# Patient Record
Sex: FEMALE | Race: WHITE | NOT HISPANIC OR LATINO | Employment: OTHER | URBAN - METROPOLITAN AREA
[De-identification: names, ages, dates, MRNs, and addresses within clinical notes are randomized per-mention and may not be internally consistent; named-entity substitution may affect disease eponyms.]

---

## 2017-03-20 ENCOUNTER — INFUSION (OUTPATIENT)
Dept: ONCOLOGY | Facility: HOSPITAL | Age: 72
End: 2017-03-20

## 2017-03-20 ENCOUNTER — LAB (OUTPATIENT)
Dept: LAB | Facility: HOSPITAL | Age: 72
End: 2017-03-20

## 2017-03-20 ENCOUNTER — OFFICE VISIT (OUTPATIENT)
Dept: ONCOLOGY | Facility: CLINIC | Age: 72
End: 2017-03-20

## 2017-03-20 VITALS — WEIGHT: 140.8 LBS | BODY MASS INDEX: 24.95 KG/M2

## 2017-03-20 VITALS
HEART RATE: 72 BPM | WEIGHT: 140.6 LBS | SYSTOLIC BLOOD PRESSURE: 132 MMHG | HEIGHT: 63 IN | DIASTOLIC BLOOD PRESSURE: 80 MMHG | TEMPERATURE: 98.4 F | BODY MASS INDEX: 24.91 KG/M2 | RESPIRATION RATE: 16 BRPM

## 2017-03-20 DIAGNOSIS — M85.80 OSTEOPENIA: ICD-10-CM

## 2017-03-20 DIAGNOSIS — D05.11 DUCTAL CARCINOMA IN SITU (DCIS) OF RIGHT BREAST: Primary | ICD-10-CM

## 2017-03-20 DIAGNOSIS — M85.80 OSTEOPENIA: Primary | ICD-10-CM

## 2017-03-20 LAB
ALBUMIN SERPL-MCNC: 4.3 G/DL (ref 3.5–5.2)
ALBUMIN/GLOB SERPL: 1.7 G/DL (ref 1.1–2.4)
ALP SERPL-CCNC: 46 U/L (ref 38–116)
ALT SERPL W P-5'-P-CCNC: 30 U/L (ref 0–33)
ANION GAP SERPL CALCULATED.3IONS-SCNC: 10.5 MMOL/L
AST SERPL-CCNC: 28 U/L (ref 0–32)
BASOPHILS # BLD AUTO: 0.05 10*3/MM3 (ref 0–0.1)
BASOPHILS NFR BLD AUTO: 1.3 % (ref 0–1.1)
BILIRUB SERPL-MCNC: 0.6 MG/DL (ref 0.1–1.2)
BUN BLD-MCNC: 19 MG/DL (ref 6–20)
BUN/CREAT SERPL: 25.7 (ref 7.3–30)
CALCIUM SPEC-SCNC: 10.4 MG/DL (ref 8.5–10.2)
CHLORIDE SERPL-SCNC: 103 MMOL/L (ref 98–107)
CO2 SERPL-SCNC: 27.5 MMOL/L (ref 22–29)
CREAT BLD-MCNC: 0.74 MG/DL (ref 0.6–1.1)
DEPRECATED RDW RBC AUTO: 42.7 FL (ref 37–49)
EOSINOPHIL # BLD AUTO: 0.24 10*3/MM3 (ref 0–0.36)
EOSINOPHIL NFR BLD AUTO: 6.4 % (ref 1–5)
ERYTHROCYTE [DISTWIDTH] IN BLOOD BY AUTOMATED COUNT: 12.5 % (ref 11.7–14.5)
GFR SERPL CREATININE-BSD FRML MDRD: 78 ML/MIN/1.73
GLOBULIN UR ELPH-MCNC: 2.5 GM/DL (ref 1.8–3.5)
GLUCOSE BLD-MCNC: 97 MG/DL (ref 74–124)
HCT VFR BLD AUTO: 43.4 % (ref 34–45)
HGB BLD-MCNC: 14.2 G/DL (ref 11.5–14.9)
HOLD SPECIMEN: NORMAL
IMM GRANULOCYTES # BLD: 0.01 10*3/MM3 (ref 0–0.03)
IMM GRANULOCYTES NFR BLD: 0.3 % (ref 0–0.5)
LYMPHOCYTES # BLD AUTO: 0.77 10*3/MM3 (ref 1–3.5)
LYMPHOCYTES NFR BLD AUTO: 20.6 % (ref 20–49)
MAGNESIUM SERPL-MCNC: 1.9 MG/DL (ref 1.8–2.5)
MCH RBC QN AUTO: 30.2 PG (ref 27–33)
MCHC RBC AUTO-ENTMCNC: 32.7 G/DL (ref 32–35)
MCV RBC AUTO: 92.3 FL (ref 83–97)
MONOCYTES # BLD AUTO: 0.33 10*3/MM3 (ref 0.25–0.8)
MONOCYTES NFR BLD AUTO: 8.8 % (ref 4–12)
NEUTROPHILS # BLD AUTO: 2.34 10*3/MM3 (ref 1.5–7)
NEUTROPHILS NFR BLD AUTO: 62.6 % (ref 39–75)
NRBC BLD MANUAL-RTO: 0 /100 WBC (ref 0–0)
PHOSPHATE SERPL-MCNC: 2.5 MG/DL (ref 2.5–4.5)
PLATELET # BLD AUTO: 179 10*3/MM3 (ref 150–375)
PMV BLD AUTO: 9.5 FL (ref 8.9–12.1)
POTASSIUM BLD-SCNC: 4.4 MMOL/L (ref 3.5–4.7)
PROT SERPL-MCNC: 6.8 G/DL (ref 6.3–8)
RBC # BLD AUTO: 4.7 10*6/MM3 (ref 3.9–5)
SODIUM BLD-SCNC: 141 MMOL/L (ref 134–145)
WBC NRBC COR # BLD: 3.74 10*3/MM3 (ref 4–10)

## 2017-03-20 PROCEDURE — 36415 COLL VENOUS BLD VENIPUNCTURE: CPT | Performed by: INTERNAL MEDICINE

## 2017-03-20 PROCEDURE — 83735 ASSAY OF MAGNESIUM: CPT | Performed by: INTERNAL MEDICINE

## 2017-03-20 PROCEDURE — 85025 COMPLETE CBC W/AUTO DIFF WBC: CPT | Performed by: INTERNAL MEDICINE

## 2017-03-20 PROCEDURE — 80053 COMPREHEN METABOLIC PANEL: CPT | Performed by: INTERNAL MEDICINE

## 2017-03-20 PROCEDURE — 96372 THER/PROPH/DIAG INJ SC/IM: CPT | Performed by: INTERNAL MEDICINE

## 2017-03-20 PROCEDURE — 25010000002 DENOSUMAB 60 MG/ML SOLUTION: Performed by: INTERNAL MEDICINE

## 2017-03-20 PROCEDURE — 84100 ASSAY OF PHOSPHORUS: CPT | Performed by: INTERNAL MEDICINE

## 2017-03-20 PROCEDURE — 99213 OFFICE O/P EST LOW 20 MIN: CPT | Performed by: INTERNAL MEDICINE

## 2017-03-20 RX ADMIN — DENOSUMAB 60 MG: 60 INJECTION SUBCUTANEOUS at 11:24

## 2017-03-20 NOTE — PROGRESS NOTES
REASONS FOR FOLLOW-UP:   1.  History of atypical lobular hyperplasia and LCIS of the breast diagnosed        2/2015   2.  Ductal carcinoma in situ, high-grade with necrosis, of the right breast        S/p lumpectomy 1/20/16 and  RT   3.  Chemoprevention with exemestane initiated February 2016   4.  Osteopenia (L hip T-score -2.1) on Prolia Q6 months    HISTORY OF PRESENT ILLNESS:     History of Present Illness  Mrs. Riggs returns today for follow-up of her above history.  Given her high risk breast findings, we initiated chemoprevention with exemestane Feb 2016.    She returned today for review.  She has had no changes on her self breast exam and continues to get imaging and follow-up with Dr. Pierce.    She complains today of pain affecting the bases of both thumbs present for approximately 3 months.  The discomfort has been stable with no worsening or alleviating factors.  She has sometimes trouble opening jars because of the discomfort.  No other joints are affected other than the base of thumb.  He has been associated with mild swelling of the joint.      Oncology/Hematology History    1. 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   2.  ductal carcinom a in situ, high grade with focal necrosis of the right breast status post lumpectomy on 01/20/2016 with negative margins; DCIS is ER/NE negative; patient received post-lumpectomy radiation therapy  3. Chemoprevention initiated with Exemestane February 2016        Ductal carcinoma in situ (DCIS) of right breast    3/16/2016 Initial Diagnosis    Ductal carcinoma in situ (DCIS) of right breast       Past Medical History, Past Surgical History, Social History, Family History have been reviewed and are without significant changes except as mentioned.    Review of Systems   A comprehensive  14 point review of systems was performed and was negative except as mentioned.    Medications:  The current medication list was reviewed in the EMR    ALLERGIES:  No Known Allergies    Objective      There were no vitals filed for this visit.  Current Status 7/1/2016   ECOG score 0       Physical Exam   Constitutional: She is oriented to person, place, and time. She appears well-developed and well-nourished.   HENT:   Head: Normocephalic and atraumatic.   Musculoskeletal:   Mild pain on palpation at the base of both thumbs, no significant effusion or warmth   Neurological: She is alert and oriented to person, place, and time.   Skin: Skin is warm and dry.          RECENT LABS:  Hematology WBC   Date Value Ref Range Status   03/20/2017 3.74 (L) 4.00 - 10.00 10*3/mm3 Final   01/11/2016 4.32 (L) 4.50 - 10.70 K/Cumm Final     RBC   Date Value Ref Range Status   03/20/2017 4.70 3.90 - 5.00 10*6/mm3 Final   01/11/2016 4.60 3.90 - 5.20 Million Final     HEMOGLOBIN   Date Value Ref Range Status   03/20/2017 14.2 11.5 - 14.9 g/dL Final   01/11/2016 13.8 11.9 - 15.5 g/dL Final     HEMATOCRIT   Date Value Ref Range Status   03/20/2017 43.4 34.0 - 45.0 % Final   01/11/2016 42.4 35.6 - 45.5 % Final     PLATELETS   Date Value Ref Range Status   03/20/2017 179 150 - 375 10*3/mm3 Final   01/11/2016 226 140 - 500 K/Cumm Final      DEXA:   osteopenia T-score -2.1 left hip (11/24/15)    Assessment/Plan       1.  High risk breast lesions including previous lobular hyperplasia and lobular carcinoma in situ of the left breast, now with ductal carcinoma in situ high-grade of the right breast status post lumpectomy and undergoing radiation therapy.  Given her high risk findings for increased risk of invasive breast cancer, the patient is undergoing chemoprevention with exemestane initiated February 2016.  She continues routine breast surveillance and exams with Dr. Pierce.    Today, she was having pain at the base of both thumbs present  for 3 months.  This may be a side effect of exemestane.  I recommended she hold the medicine for 3-4 weeks.  Usually if arthralgia is related to an aromatase inhibitor, it will improve rather quickly.  If the pain does not improve with holding exemestane, I recommended she see her primary care physician.  If the pain improves, she can retry exemestane after a 3-4 week washout period.  If pain recurs, I requested she call our office and we will switch to an alternative aromatase inhibitor.    3.  Osteopenia:  Given the risk of further bone loss with her aromatase inhibitor, we initiated Prolia 3/18/16. .  We will continue Prolia every 6 months while on aromatase inhibitor therapy.  She will have a BMP, mag, en face checked prior to each injection.  We will recheck her DEXA scan after being on the AI for 2 years.                    3/20/2017      CC:

## 2017-04-27 ENCOUNTER — HOSPITAL ENCOUNTER (OUTPATIENT)
Dept: GENERAL RADIOLOGY | Facility: HOSPITAL | Age: 72
Discharge: HOME | End: 2017-04-27
Admitting: NURSE PRACTITIONER

## 2017-04-27 ENCOUNTER — TRANSCRIBE ORDERS (OUTPATIENT)
Dept: ADMINISTRATIVE | Facility: HOSPITAL | Age: 72
End: 2017-04-27

## 2017-04-27 DIAGNOSIS — M25.60 STIFFNESS IN JOINT: ICD-10-CM

## 2017-04-27 DIAGNOSIS — R52 PAIN: ICD-10-CM

## 2017-04-27 DIAGNOSIS — M25.60 STIFFNESS IN JOINT: Primary | ICD-10-CM

## 2017-04-27 PROCEDURE — 73130 X-RAY EXAM OF HAND: CPT

## 2017-05-01 RX ORDER — EXEMESTANE 25 MG/1
TABLET ORAL
Qty: 30 TABLET | Refills: 0 | Status: SHIPPED | OUTPATIENT
Start: 2017-05-01 | End: 2017-06-13

## 2017-05-03 ENCOUNTER — TELEPHONE (OUTPATIENT)
Dept: ONCOLOGY | Facility: CLINIC | Age: 72
End: 2017-05-03

## 2017-06-01 ENCOUNTER — HOSPITAL ENCOUNTER (OUTPATIENT)
Dept: MRI IMAGING | Facility: HOSPITAL | Age: 72
Discharge: HOME | End: 2017-06-01
Attending: SURGERY | Admitting: SURGERY

## 2017-06-01 ENCOUNTER — APPOINTMENT (OUTPATIENT)
Dept: WOMENS IMAGING | Facility: HOSPITAL | Age: 72
End: 2017-06-01

## 2017-06-01 DIAGNOSIS — D05.91 CARCINOMA IN SITU OF RIGHT BREAST: ICD-10-CM

## 2017-06-01 DIAGNOSIS — N64.89 RADIAL SCAR OF BREAST: ICD-10-CM

## 2017-06-01 DIAGNOSIS — D05.02 LOBULAR CARCINOMA IN SITU OF LEFT BREAST: ICD-10-CM

## 2017-06-01 LAB — CREAT BLDA-MCNC: 0.7 MG/DL (ref 0.6–1.3)

## 2017-06-01 PROCEDURE — C8908 MRI W/O FOL W/CONT, BREAST,: HCPCS

## 2017-06-01 PROCEDURE — G0206 DX MAMMO INCL CAD UNI: HCPCS | Performed by: RADIOLOGY

## 2017-06-01 PROCEDURE — G0279 TOMOSYNTHESIS, MAMMO: HCPCS | Performed by: RADIOLOGY

## 2017-06-01 PROCEDURE — 0 GADOBENATE DIMEGLUMINE 529 MG/ML SOLUTION: Performed by: SURGERY

## 2017-06-01 PROCEDURE — 0159T HC MRI BREAST COMPUTER ANALYSIS: CPT

## 2017-06-01 PROCEDURE — 82565 ASSAY OF CREATININE: CPT

## 2017-06-01 PROCEDURE — A9577 INJ MULTIHANCE: HCPCS | Performed by: SURGERY

## 2017-06-01 RX ADMIN — GADOBENATE DIMEGLUMINE 13 ML: 529 INJECTION, SOLUTION INTRAVENOUS at 12:00

## 2017-06-06 ENCOUNTER — TELEPHONE (OUTPATIENT)
Dept: SURGERY | Facility: CLINIC | Age: 72
End: 2017-06-06

## 2017-06-13 ENCOUNTER — TELEPHONE (OUTPATIENT)
Dept: SURGERY | Facility: CLINIC | Age: 72
End: 2017-06-13

## 2017-06-13 ENCOUNTER — OFFICE VISIT (OUTPATIENT)
Dept: SURGERY | Facility: CLINIC | Age: 72
End: 2017-06-13

## 2017-06-13 VITALS
TEMPERATURE: 97.4 F | HEIGHT: 64 IN | HEART RATE: 66 BPM | DIASTOLIC BLOOD PRESSURE: 71 MMHG | OXYGEN SATURATION: 98 % | WEIGHT: 142.2 LBS | BODY MASS INDEX: 24.28 KG/M2 | SYSTOLIC BLOOD PRESSURE: 140 MMHG

## 2017-06-13 DIAGNOSIS — R92.8 ABNORMAL MRI, BREAST: Primary | ICD-10-CM

## 2017-06-13 DIAGNOSIS — D05.91 CARCINOMA IN SITU OF RIGHT BREAST: ICD-10-CM

## 2017-06-13 DIAGNOSIS — D05.02 LOBULAR CARCINOMA IN SITU OF LEFT BREAST: ICD-10-CM

## 2017-06-13 DIAGNOSIS — N64.89 RADIAL SCAR OF BREAST: ICD-10-CM

## 2017-06-13 PROCEDURE — 99214 OFFICE O/P EST MOD 30 MIN: CPT | Performed by: SURGERY

## 2017-06-13 RX ORDER — MELOXICAM 7.5 MG/1
7.5 TABLET ORAL DAILY
COMMUNITY
End: 2019-12-02

## 2017-06-13 NOTE — TELEPHONE ENCOUNTER
[facility] with [facility].  The breast is heterogenous leg dense.  Fat containing focal asymmetry with postsurgical scar posterior one third upper inner left breast, 8 cm from the nipple.  Calcifications are new compatible with oral cyst.  Stable postsurgical scar posterior one third upper outer left breast at area of excisional biopsy.  BI-RADS 3 recommend 6 month mammogram follow-up left calcifications.

## 2017-06-13 NOTE — PROGRESS NOTES
Chief Complaint: Reyna Riggs is a 71 y.o. 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  She noted no masses, skin changes, nipple discharge, nipple changes prior to her most recent imaging.       Her most recent imaging includes:  08/19/09          [facility]        Yearly screening mammogram          Yulia B. Mudge  The breasts are heterogeneously dense.   BIRADS Category 1: Negative     08/27/10        [facility]         Yearly screening mammogram         Yulia B. Mudge   The breasts are heterogeneously dense.   BIRADS Category 1: Negative     09/08/11         [facility]         Yearly screening mammogram        Yulia B [place]   The breasts are heterogeneously dense.   There is a stable area of architectural distortion with associated post-surgical scar seen in the upper outer region of the left breast. No significant change. Patient has reported has prior excisional left breast biopsy to account for this finding.   BIRADS Category 2: Benign     09/17/12        [facility]         Yearly screening mammogram        Yulia B [place]   The breasts are heterogeneously dense.   Architectural distortion is in and area of prior excisional biopsy. No significant change.   BIRADS Category 2: Benign     09/19/13        [facility]          Yearly screening mammogram           Yulia B [place]   The breasts are heterogeneously dense.   Stable area of architectural distortion upper outer region of the left breast.   BIRADS Category 2: Benign     11/12/14          [facility]           BILATERAL SCREENING MAMMOGRAM WITH TOMOSYNTHESIS       Reyna Riggs   The breasts are heterogeneously dense.   Finding 1:  "irregular mass measuring 11 millimeters posterior one-third of the left breast at 9 o'clock, in the central region and in the medial region. This finding is only seen on tomosynthesis.   Finding 2: Area of architectural distortion seen in the lateral region of the left breast.   In the right breast, no masses, significant calcifications or other abnormalities are seen.   IMPRESSION:   Finding 1: Requires additional evaluation.   Finding 2: Requires additional evaluation.   BIRADS Category 0 Incomplete     12/02/14       [facility]           LEFT DIAGNOSTIC MAMMOGRAM WITH TOMOSYNTHESIS       Reyna OSCAR Keely   The breast is heterogeneously dense.   Finding 1: irregular mass left breast 8 millimeters posterior one-third 10:30 o'clock region of the left breast located 6 centimeters from the nipple.   Finding 2: Area of architectural distortion in the left breast does not persist.   LEFT BREAST ULTRASOUND:  Finding 1: irregular taller-than-wide hypoechoic area with indisict margins measuring 5 x 3 x 2 mm.   Finding 2: There is no evidence of any solid mass or abnormal cystic elements.   IMPRESSION:   Finding 1: Suspicious   Finding 2: Benign-Negative   BIRADS Category 4A: Suspicious Abnormality    She had a biopsy on the following day that showed:    12/11/14        [facility]           Left Ultrasound Guided Vacuum Assisted Biopsy                           Reyna MOORE Keely  10:30 left breast  12 gauge  A total of 11 cores a(n) minicork shaped s-shereen tissue marker was placed at the biopsy site.   ADDENDUM 12-16-14:  Returned atypical lobular hyperplasia arising a radial scar.   Post clip mammogram demonstrates good position of the mini \"cork\" shaped clip at the 10:30 position of the left breast with no significant hematoma formation.     12/11/14        [facility]           Pathology Report            Reyna Riggs   Left Breast 10:30:  Atypical lobular hyperplasia arising in radial scar.       She has had  2 excisional breast biopsies  in " the past, LEFT breast, benign, age 42.  She has her uterus and ovaries, is postmenopausal, and takes no hormones.  Her family history includes the following: She has one sister, one brother, 2 daughters, 3 MA, 3 MU, 3 PU, 3 PA. No breast or ovarian cancer in her family.     We called Dr Melton to clarify preoperative CXR mention of tiny hyperdense nodules in the RIGHT lung- he stated not worrisome, no additional imaging or FU needed.    We went to the operating room on 1-7-15 for lumpectomy for excision atypical hyperplasia.    1-7-15 excision ALH returned as LCIS, 1.6 cm, focally present at inferior margin.    In the interim,  Yulia  has done well.  She has noted no changes in her breast exam. No new masses, skin changes,  nipple changes, nipple discharge either breast.   She denies headache, bone pain, belly pain, cough, changes in vision or gait.  She met with Dr Munguia and they decided not to start chemoprevention.      Her most recent imaging includes the followin/15/15            [facility]            LEFT BREAST MAMMOGRAM WITH TOMOSYNTHESIS          [place]     post biopsy follow-up.   the breast is heterogeneously dense.   Follow-up focal asymmetry in the left breast, 10:30 o'clock post surgical scar posterior one third medial left breast. In an area of prior excisional biopsy.   IMPRESSION:   Benign Negative  BI-RADS Category 2: Benign     In the interim,  Yulia  has done well.  She has noted no changes in her breast exam. No new masses, skin changes,  nipple changes, nipple discharge either breast.   She denies headache, bone pain, belly pain, cough, changes in vision or gait.    Her most recent imaging includes the followin-13-15       [facility]       Bilateral Screening Mammogram with Tomosynthesis      [place], [place]  The breast are heterogenously dense,  stable post- surgical scar medial region of the left breast. in  an area of prior excisional biopsy.  ImpressioN  benign-negative  Birads Category  2:  Benign    11-13-15        [facility]        Bilateral Breast MRi         Reyna Riggs  posterior one third upper inner left breast 10 o'clock there is mild postsurgical architectural distortion.  Within the posterior one third retroareolar region of the right breast 7.3  cm  posterior to   the nipple there is linear, branching enhancement that extends 2.2 cm anterior  to posterior,  0.4 and 1.2 cm in superior to inferior Mammographically, there are no calcifications seen within   this region.  Impression:  1.  Postsurgical site left breast with enhancement of a typically benign reactive character.   The imaging features do not have a suspicious feature but are somewhat indeterminate and the  area is likely best evaluated with a follow up breast MRI in 6 months to 12 months.  2.  Linear, branching enhancement seen in the posterior one third of   retroareolar region of the right breast.  Correlation with an MR guided right breast  biopsy is recommended.  Birads Category 4: Suspicious      I then arranged for a RIGHT breast MRI guided biopsy:    12/01/15                  [facility]              MRI GUIDED MAMMOTOME VACUUM ASSISTED RIGHT BREAST BIOPSY                 Reyna Riggs Yulia   8-gauge   Multiple tissue specimens  Post biopsy mammogram was obtained demonstrating adequate placement of a metallic clip in th eposterior 1/3 retroareolar region of the right breast.   The pathology result has returned as DCIS. This is concordant.     12/01/15                [facility]              RIGHT BREAST MRI BIOPSY            Pathology         Reyna Riggs   Diagnosis:   Right breast tissue, MRI directed biopsies:  Ductal carcinoma in situ, intermediate to high nuclear grade, without necrosis.   Hormone receptor studies pending   maximum span of 3mm.     12/01/15                       ER/CO                        Reyna Riggs  ER  0%  CO  0%      We went to the operating room on 1-20-16 for bracketted MRI guided needle loc lumpectomy-  pathology returned as 8mm DCIS, focally high grade, focal necrosis, margins, clear-- closest is to superior margin- typographical error in report, called on this and was <1/2 mm from superior margin,  but additional superior margin clear.   Path stage TisN0- stage 0.    She reports some lightning pain in her nipple. Denies any redness, warmth, drainage.      In the interim,  Reyna Riggs  has done well.  She took whole breast plus boost radiation 6 weeks with Dr. Parr from February 29, 2016 through April 8, 2016.  She tolerated this well.    She started Aromasin) Marie up with Dr. Munguia February 2016.  She is noticed a 5 pound weight gain.  Otherwise she feels that she is tolerating a well.    She has noted no changes in her breast exam. No new masses, skin changes, nipple changes, nipple discharge either breast.   She denies headache, bone pain, belly pain, cough, changes in vision or gait.  Her most recent imaging includes the following:  [facility] with [facility]D November 14, 2016 heterogenous a dense tissue.  Post surgical scar in both breast.  BI-RADS 2.  [facility] bilateral breast MRI November 16, 2016.  Peripheral enhancement focally prominent 4 mm posterior one third medial left breast.  No finding at the right breast directly site.  Recommend six-month left mammogram and 6 versus 12 month MRI.  BI-RADS 3.      Interval History:  In the interim,  Reyna Riggs  has done well.  She has noted no changes in her breast exam. No new masses, skin changes, nipple changes, nipple discharge either breast.   She denies headache, bone pain, belly pain, cough, changes in vision or gait.    She has had trouble with LEFT hand pain and some arthritis, so she stopped her aromasin in March.  She did not notice a great deal of improvement until she was started on meloxicam by Dr Pallares.    Her most recent imaging includes the following:  [facility] June  1, 2017 bilateral breast MRI for follow-up abnormal MRI finding.  There is been resolution of all abnormal enhancement in the left breast since prior examination.  BI-RADS 2 benign.  [facility] 2017 left diagnostic mammogram with 3-D.  The breast is heterogenous a dense.  Focal asymmetry posterior one third upper inner left breast at the site of her procedure likely calcified oil cyst or fat necrosis.  Follow up mammogram in 6 months is recommended BI-RADS 3.    Her weight is stable.      Review of Systems:  Review of Systems   Constitutional: Positive for unexpected weight change (5lb weight gain).   All other systems reviewed and are negative.       Past Medical and Surgical History:  Breast Biopsy History:  Patient has had the following breast biopsies:2 prior excisional biopsies LEFT breast around age 42. UOQ and lateral.  Breast Cancer HIstory:  Patient does not have a past medical history of breast cancer.  Breast Operations, and year:  None   Obstetric/Gynecologic History:  Age menstrual periods began:15  Patient is postmenopausal, entered menopause naturally at age: 52   Number of pregnancies:2  Number of live births: 2  Number of abortions or miscarriages: 0  Age of delivery of first child: 27  Patient did not breast feed.  Length of time taking birth control pills:n/a  Patient has never taken hormone replacement  The patient still has her uterus and ovaries.      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        Prior Hospitalizations, other than for surgery or childbirth, and year:  None     Social History  "    Social History   • Marital status:      Spouse name: Juan David   • Number of children: 2   • Years of education: College     Occupational History   • Retired [employer]     Worked in Human Resources     Social History Main Topics   • Smoking status: Former Smoker     Packs/day: 1.00     Years: 10.00   • Smokeless tobacco: Never Used   • Alcohol use Yes      Comment: 1 GLASS OF ALCOHOL DAILY   • Drug use: No   • Sexual activity: Not on file     Other Topics Concern   • Not on file     Social History Narrative    She is very active. Enjoys golfing, walking, sewing, etc.     Patient is .  Patient is retired.  Patient drinks 1 servings of caffeine per day.    Family History:  Family History   Problem Relation Age of Onset   • Esophageal cancer Father 74   • Kidney cancer Brother 53   • Diabetes Other    • Diabetes Mother    • Diabetes Sister        Vital Signs:  /71 (BP Location: Left arm, Patient Position: Sitting, Cuff Size: Adult)  Pulse 66  Temp 97.4 °F (36.3 °C) (Temporal Artery )   Ht 64\" (162.6 cm)  Wt 142 lb 3.2 oz (64.5 kg)  SpO2 98%  BMI 24.41 kg/m2     Medications:    Current Outpatient Prescriptions:   •  aspirin 81 MG tablet, Take 81 mg by mouth daily., Disp: , Rfl:   •  Cholecalciferol (VITAMIN D) 1000 UNITS tablet, Take 1,000 Units by mouth daily., Disp: , Rfl:   •  meloxicam (MOBIC) 7.5 MG tablet, Take 7.5 mg by mouth Daily., Disp: , Rfl:   •  Multiple Vitamin (MULTI-VITAMIN PO), Take  by mouth., Disp: , Rfl:   •  sertraline (ZOLOFT) 25 MG tablet, Take 25 mg by mouth daily., Disp: , Rfl:      Allergies:  No Known Allergies    Physical Examination:  /71 (BP Location: Left arm, Patient Position: Sitting, Cuff Size: Adult)  Pulse 66  Temp 97.4 °F (36.3 °C) (Temporal Artery )   Ht 64\" (162.6 cm)  Wt 142 lb 3.2 oz (64.5 kg)  SpO2 98%  BMI 24.41 kg/m2  General Appearance:  Patient is in no distress.  She is well kept and has an average  build.   Psychiatric:  Patient " with appropriate mood and affect. Alert and oriented to self, time, and place.    Breast, RIGHT: large  sized, symmetric but slightly larger than the LEFT breast.   Breast skin is without erythema, edema, rashes.  There are no visible abnormalities upon inspection during the arm-raising maneuver or with hands on hips in the sitting position. There is no nipple retraction, discharge or nipple/areolar skin changes.There are no masses palpable in the sitting or supine positions. There is a well healed RIGHT lateral radial incision  from  lumpectomy for DCIS.    Breast, LEFT:  large  sized, symmetric but slightly smaller than the RIGHT breast .  Breast skin is without erythema, edema, rashes.  There are no visible abnormalities upon inspection during the arm-raising maneuver or with hands on hips in the sitting position. There is no nipple retraction, discharge or nipple/areolar skin changes.There are no masses palpable in the sitting or supine positions. There is a well healed UIQ curvilinear incision from her lumpectomy for ALH/upgrade to LCIS. There is a slight tissue void at the site of lumpectomy.    Lymphatic:  There is no axillary, cervical, infraclavicular, or supraclavicular adenopathy bilaterally.  Eyes:  Pupils are round and reactive to light.  Cardiovascular:  Heart rate and rhythm are regular.  Respiratory:  Lungs are clear bilaterally with no crackles or wheezes in any lung field.  Gastrointestinal:  Abdomen is soft, nondistended, and nontender.  Musculoskeletal:  Good strength in all 4 extremities.  There is good range of motion in both shoulders.   Skin:  No new skin lesions or rashes on the skin excluding the breast (see breast exam above).        Imagin/12/14          [facility]           BILATERAL SCREENING MAMMOGRAM WITH TOMOSYNTHESIS       [place] A Mudge   The breasts are heterogeneously dense.   Finding 1: irregular mass measuring 11 millimeters posterior one-third of the left breast at 9  o'clock, in the central region and in the medial region. This finding is only seen on tomosynthesis.   Finding 2: Area of architectural distortion seen in the lateral region of the left breast.   In the right breast, no masses, significant calcifications or other abnormalities are seen.   IMPRESSION:   Finding 1: Requires additional evaluation.   Finding 2: Requires additional evaluation.   BIRADS Category 0 Incomplete     12/02/14       [facility]           LEFT DIAGNOSTIC MAMMOGRAM WITH TOMOSYNTHESIS       [place] A Mudge   The breast is heterogeneously dense.   Finding 1: irregular mass left breast 8 millimeters posterior one-third 10:30 o'clock region of the left breast located 6 centimeters from the nipple.   Finding 2: Area of architectural distortion in the left breast does not persist.   LEFT BREAST ULTRASOUND:  Finding 1: irregular taller-than-wide hypoechoic area with indisict margins measuring 5 x 3 x 2 mm.   Finding 2: There is no evidence of any solid mass or abnormal cystic elements.   IMPRESSION:   Finding 1: Suspicious   Finding 2: Benign-Negative   BIRADS Category 4A: Suspicious Abnormality  On bedside ultrasound, I can see the biopsy site at the LEFT breast 10;30 5.5 CFN location. Will use needle loc.    07/15/15            [facility]            LEFT BREAST MAMMOGRAM WITH TOMOSYNTHESIS          [place] A Mudge   post biopsy follow-up.   the breast is heterogeneously dense.   Follow-up focal asymmetry in the left breast, 10:30 o'clock post surgical scar posterior one third medial left breast. In an area of prior excisional biopsy.   IMPRESSION:   Benign Negative  BI-RADS Category 2: Benign     11-13-15       [facility]       Bilateral Screening Mammogram with Tomosynthesis      Mudge, [place]  The breast are heterogenously dense,  stable post- surgical scar medial region of the left breast. in  an area of prior excisional biopsy.  ImpressioN  benign-negative  Birads Category 2:  Benign    11-13-15        [facility]         Bilateral Breast MRi         Keely, [place]  posterior one third upper inner left breast 10 o'clock there is mild postsurgical architectural distortion.  Within the posterior one third retroareolar region of the right breast 7.3  cm  posterior to   the nipple there is linear, branching enhancement that extends 2.2 cm anterior  to posterior,  0.4 and 1.2 cm in superior to inferior Mammographically, there are no calcifications seen within   this region.  Impression:  1.  Postsurgical site left breast with enhancement of a typically benign reactive character.   The imaging features do not have a suspicious feature but are somewhat indeterminate and the  area is likely best evaluated with a follow up breast MRI in 6 months to 12 months.  2.  Linear, branching enhancement seen in the posterior one third of   retroareolar region of the right breast.  Correlation with an MR guided right breast  biopsy is recommended.  Birads Category 4: Suspicious    11-   [facility] BILATERAL SCREENING MAMMOGRAM WITH TOMOSYNTHESIS          MEET JONES  The breasts are heterogeneously dense.  Finding 1. Post-surgical scars upper outer region of both breasts.    Finding 2. Stable post-surgical scar upper inner left breast.    IMPRESSION:  BI-RADS Category 2: Benign    11-16-16                            [facility]                                  BILATERAL BREAST MRI                    MEET JONES  IMPRESSION:  1. Post breast conservation therapy changes in the left breast at the 9 o’clock position. I recommend the patient undergo 6 month mammographic follow-up of the left breast and MRI follow-up of both breast in 6 months to 12 months.  2. There are no findings suspicious for malignancy in the right breast.  BI-RADS Category 3: Probably benign    [facility]. The breast is heterogenous leg dense. Fat containing focal asymmetry with postsurgical scar posterior one third upper inner  "left breast, 8 cm from the nipple. Calcifications are new compatible with oral cyst. Stable postsurgical scar posterior one third upper outer left breast at area of excisional biopsy. BI-RADS 3 recommend 6 month mammogram follow-up left calcifications.    June 5, 2017 bilateral breast MRI no findings suspicious for malignancy in either breast. BI-RADS 2.    Pathology:    12/11/14        [facility]           Left Ultrasound Guided Vacuum Assisted Biopsy                           Reyna Riggs  10:30 left breast  12 gauge  A total of 11 cores a(n) minicork shaped s-shereen tissue marker was placed at the biopsy site.   ADDENDUM 12-16-14:  Returned atypical lobular hyperplasia arising a radial scar.   Post clip mammogram demonstrates good position of the mini \"cork\" shaped clip at the 10:30 position of the left breast with no significant hematoma formation.     12/11/14        [facility]           Pathology Report            Ryena Riggs   Left Breast 10:30:  Atypical lobular hyperplasia arising in radial scar.     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       ESTIMATED EXTENT: 1.6 CM (LCIS PRESENT IN FOUR BLOCKS X 0.4 CM PER BLOCK        = 1.6 CM).       MARGINS FOCALLY INVOLVED (INFERIOR) see note.       FOCAL SCLEROSING FIBROSIS.       SCLEROSING ADENOSIS.       BIOPSY SITE CHANGE.    NOTE: Margins are difficult to evaluate due to extensive cautery artifact.   Recognizable LCIS is present focally at a cauterized inferior margin.  Immunostain for e-cadherin is negative within the carcinoma in situ areas   supportive of lobular carcinoma in situ. Internal and external controls are  appropriate. Reviewed with Dr. LESLEY Gregory who concurs.      12/01/15                  Located within [facility]              MRI GUIDED " MAMMOTOME VACUUM ASSISTED RIGHT BREAST BIOPSY                 Reyna Riggs Yulia   8-gauge   Multiple tissue specimens  Post biopsy mammogram was obtained demonstrating adequate placement of a metallic clip in th eposterior 1/3 retroareolar region of the right breast.   The pathology result has returned as DCIS. This is concordant.     12/01/15                [facility]              RIGHT BREAST MRI BIOPSY            Pathology         Keely Reyna   Diagnosis:   Right breast tissue, MRI directed biopsies:  Ductal carcinoma in situ, intermediate to high nuclear grade, without necrosis.   Hormone receptor studies pending   maximum span of 3mm.     12/01/15                       ER/NM                        Reyna Riggs  ER  0%  NM  0%      Received: 1/20/2016  Reported: 1/21/2016    Clinical Diagnosis and History       Right breast DCIS.        Oper: Right breast lumpectomy with MRI guided needle IOC and intraoperative  ultrasound.     Diagnosis  1: RIGHT BREAST LUMPECTOMY:   DUCT CARCINOMA IN SITU, INTERMEDIATE TO HIGH-GRADE WITH FOCAL NECROSIS.  ESTIMATED MAXIMUM DIMENSION OF DUCT CARCINOMA IN SITU IS 8 MM.  DUCT CARCINOMA IN SITU IS LESS THAN d MM FROM SUPERIOR MARGIN.   CHANGES OF PRIOR BIOPSY ARE NOTED.   (FOR ADDITIONAL DETAILS SEE SYNOPTIC REPORT BELOW).    2: ADDITIONAL RIGHT SUPERIOR MARGIN:       BENIGN BREAST TISSUE.     3: ADDITIONAL RIGHT INFERIOR MARGIN:       BENIGN BREAST TISSUE WITH FOCAL APOCRINE METAPLASIA.     4: ADDITIONAL RIGHT MEDIAL MARGIN:       BENIGN BREAST TISSUE WITH FOCAL ADENOSIS.     5: ADDITIONAL RIGHT LATERAL MARGIN:       BENIGN BREAST TISSUE.     6: ADDITIONAL RIGHT DEEP MARGIN:       BENIGN BREAST TISSUE.       SYNOPTIC REPORT (Based on [facility] Cancer Case Summary, version December 2013):       Procedure: Excision with wire-guided localization.        Specimen Laterality: Right.        Size (Extent) of DCIS: Estimated size of DCIS is at least 8 mm.             Comment: This dimension is  based on the presence of duct carcinoma  present in two consecutively       obtained tissue blocks each measuring approximately 4 mm in thickness.        Histologic Type: Duct carcinoma in situ.        Nuclear Grade: Focally high grade (grade 3).        Necrosis: Focally present.        Margins: Margins uninvolved by duct carcinoma in situ.             Distance from Closest Margin: Less than d mm from superior margin of  specimen 1. Additionally       excised margins, including superior margin (specimen 2) are free of duct  carcinoma in situ.   Pathologic Staging:            Primary Tumor: pTis (DCIS).             Regional Lymph Nodes: pNX.        Ancillary Studies: ER and DC has been performed on prior biopsy material  ([id number]).       [facility]  [facility]      Procedures:      Assessment:   Diagnosis Plan   1. Abnormal MRI, breast     2. Carcinoma in situ of right breast  Mammo Diagnostic Left With CAD    Mammo Diagnostic Digital Tomosynthesis Bilateral With CAD   3. 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 Radial scar of breast  Mammo Diagnostic Left With CAD     1-  RIGHT breast DCIS  Mammographically occult  Seen on MRI as 2.2 x 1.2 cm, posterior 1/3, RA, 7 CFN,  area of enhancement- MRI guided biopsy returend as DCIS, int to high grade, ER0PR0.  Clinical stage TisN0    1-20-16 bracketted MRI guided needle loc lumpectomy- pathology returned as 8mm DCIS, focally high grade, focal necrosis, margins, clear-- closest is to superior margin- typographical error in report, called on this and was <1/2 mm from superior margin,  but additional superior margin clear.   Path stage TisN0- stage 0.      Dr. Parr from 3/29 2016 through April 8, 2016 whole breast plus boost over 5 and half weeks.  Dr. Munguia February 2016 Aromasin plus prolia- stopped aromasin 3-2017 due to arthralgias notably in hand- some improvement after  starting meloxicam    2-3  LEFT breast ALH in a radial scar UIQ- 10:30 5.5 CFN- cork clip-     1-7-15 excision ALH returned as upgrade to  LCIS, 1.6 cm, focally present at inferior margin.  Saw Dr Munguia, declined chemoprevention    4 -  [facility] MRI 4 mm focus of enhancement at excision site, BI-RADS 3- Br2 with complete resolution in 6-2017 MRI      Plan:  Reyna Riggs is doing well today at her followup visit.  She is a year and a half out from her right breast duct carcinoma in situ in 2-1/2 years out from her excision of left breast atypical lobular hyperplasia.    She was having left hand pain and stopped the Aromasin in March.  She tells me that she did not note a significant difference off of the Aromasin but did notice significant improvement when she started the meloxicam.    I advised her to resume her Aromasin and if she had persistent or recurrent trouble with arthralgias that were intolerable to discuss this again with Dr. Munguia at their September visit.  At that time she may look into an alternative aromatase inhibitor to Aromasin.      She was also mentioning some concerns about her bone density in her teeth and recent dental troubles.  I deferred this to Dr. Munguia, her dentist,  and her primary care doctor.    We discussed her imaging findings and reports today and I reassured her that in the left breast is most likely fat necrosis or calcifying scar tissue at the lumpectomy site.  [facility]  I will see her back after.    If she is interested in continued MRI surveillance, I would recommend that we hold off on this until November 2018 when her insurance will cover this, and she can have it at the same time as her screening mammogram in November 2018.      I asked her to continue her self breast exam and to call in the interim with concerns or changes.      Shante Pierce MD        We have spent 25 minutes in  visit today, 14 in face to face .      Next Appointment:  Return for Next scheduled follow up, after imaging.      EMR Dragon/transcription disclaimer:    Much of this encounter note is an electronic transcription/translocation of spoken language to printed text.  The electronic translation of spoken language may permit erroneous, or at times, nonsensical words or phrases to be inadvertently transcribed.  Although I have reviewed the note from such areas, some may still exist.

## 2017-08-04 RX ORDER — EXEMESTANE 25 MG/1
TABLET ORAL
Qty: 30 TABLET | Refills: 0 | Status: SHIPPED | OUTPATIENT
Start: 2017-08-04 | End: 2017-09-27 | Stop reason: SDUPTHER

## 2017-09-21 DIAGNOSIS — M85.80 OSTEOPENIA: ICD-10-CM

## 2017-09-25 ENCOUNTER — OFFICE VISIT (OUTPATIENT)
Dept: ONCOLOGY | Facility: CLINIC | Age: 72
End: 2017-09-25

## 2017-09-25 ENCOUNTER — LAB (OUTPATIENT)
Dept: LAB | Facility: HOSPITAL | Age: 72
End: 2017-09-25

## 2017-09-25 ENCOUNTER — INFUSION (OUTPATIENT)
Dept: ONCOLOGY | Facility: HOSPITAL | Age: 72
End: 2017-09-25

## 2017-09-25 VITALS
DIASTOLIC BLOOD PRESSURE: 80 MMHG | TEMPERATURE: 97.8 F | OXYGEN SATURATION: 97 % | SYSTOLIC BLOOD PRESSURE: 138 MMHG | WEIGHT: 139.6 LBS | HEIGHT: 63 IN | BODY MASS INDEX: 24.73 KG/M2 | HEART RATE: 73 BPM

## 2017-09-25 DIAGNOSIS — D05.02 LOBULAR CARCINOMA IN SITU OF LEFT BREAST: Primary | ICD-10-CM

## 2017-09-25 DIAGNOSIS — M85.80 OSTEOPENIA: ICD-10-CM

## 2017-09-25 DIAGNOSIS — D05.91 CARCINOMA IN SITU OF RIGHT BREAST: ICD-10-CM

## 2017-09-25 DIAGNOSIS — M85.80 OSTEOPENIA: Primary | ICD-10-CM

## 2017-09-25 DIAGNOSIS — M85.821 OTHER SPECIFIED DISORDERS OF BONE DENSITY AND STRUCTURE, RIGHT UPPER ARM: ICD-10-CM

## 2017-09-25 LAB
ALBUMIN SERPL-MCNC: 4.3 G/DL (ref 3.5–5.2)
ALBUMIN/GLOB SERPL: 1.6 G/DL (ref 1.1–2.4)
ALP SERPL-CCNC: 56 U/L (ref 38–116)
ALT SERPL W P-5'-P-CCNC: 20 U/L (ref 0–33)
ANION GAP SERPL CALCULATED.3IONS-SCNC: 9.8 MMOL/L
AST SERPL-CCNC: 24 U/L (ref 0–32)
BASOPHILS # BLD AUTO: 0.04 10*3/MM3 (ref 0–0.1)
BASOPHILS NFR BLD AUTO: 0.9 % (ref 0–1.1)
BILIRUB SERPL-MCNC: 0.5 MG/DL (ref 0.1–1.2)
BUN BLD-MCNC: 23 MG/DL (ref 6–20)
BUN/CREAT SERPL: 29.9 (ref 7.3–30)
CALCIUM SPEC-SCNC: 10.4 MG/DL (ref 8.5–10.2)
CHLORIDE SERPL-SCNC: 102 MMOL/L (ref 98–107)
CO2 SERPL-SCNC: 28.2 MMOL/L (ref 22–29)
CREAT BLD-MCNC: 0.77 MG/DL (ref 0.6–1.1)
DEPRECATED RDW RBC AUTO: 45.1 FL (ref 37–49)
EOSINOPHIL # BLD AUTO: 0.32 10*3/MM3 (ref 0–0.36)
EOSINOPHIL NFR BLD AUTO: 7.4 % (ref 1–5)
ERYTHROCYTE [DISTWIDTH] IN BLOOD BY AUTOMATED COUNT: 13 % (ref 11.7–14.5)
GFR SERPL CREATININE-BSD FRML MDRD: 74 ML/MIN/1.73
GLOBULIN UR ELPH-MCNC: 2.7 GM/DL (ref 1.8–3.5)
GLUCOSE BLD-MCNC: 93 MG/DL (ref 74–124)
HCT VFR BLD AUTO: 45.1 % (ref 34–45)
HGB BLD-MCNC: 14.7 G/DL (ref 11.5–14.9)
HOLD SPECIMEN: NORMAL
IMM GRANULOCYTES # BLD: 0.01 10*3/MM3 (ref 0–0.03)
IMM GRANULOCYTES NFR BLD: 0.2 % (ref 0–0.5)
LYMPHOCYTES # BLD AUTO: 0.76 10*3/MM3 (ref 1–3.5)
LYMPHOCYTES NFR BLD AUTO: 17.7 % (ref 20–49)
MAGNESIUM SERPL-MCNC: 2.1 MG/DL (ref 1.8–2.5)
MCH RBC QN AUTO: 30.8 PG (ref 27–33)
MCHC RBC AUTO-ENTMCNC: 32.6 G/DL (ref 32–35)
MCV RBC AUTO: 94.5 FL (ref 83–97)
MONOCYTES # BLD AUTO: 0.37 10*3/MM3 (ref 0.25–0.8)
MONOCYTES NFR BLD AUTO: 8.6 % (ref 4–12)
NEUTROPHILS # BLD AUTO: 2.8 10*3/MM3 (ref 1.5–7)
NEUTROPHILS NFR BLD AUTO: 65.2 % (ref 39–75)
NRBC BLD MANUAL-RTO: 0 /100 WBC (ref 0–0)
PHOSPHATE SERPL-MCNC: 2.4 MG/DL (ref 2.5–4.5)
PLATELET # BLD AUTO: 192 10*3/MM3 (ref 150–375)
PMV BLD AUTO: 9.7 FL (ref 8.9–12.1)
POTASSIUM BLD-SCNC: 4.7 MMOL/L (ref 3.5–4.7)
PROT SERPL-MCNC: 7 G/DL (ref 6.3–8)
RBC # BLD AUTO: 4.77 10*6/MM3 (ref 3.9–5)
SODIUM BLD-SCNC: 140 MMOL/L (ref 134–145)
WBC NRBC COR # BLD: 4.3 10*3/MM3 (ref 4–10)

## 2017-09-25 PROCEDURE — 99213 OFFICE O/P EST LOW 20 MIN: CPT | Performed by: INTERNAL MEDICINE

## 2017-09-25 PROCEDURE — 25010000002 DENOSUMAB 60 MG/ML SOLUTION: Performed by: INTERNAL MEDICINE

## 2017-09-25 PROCEDURE — 84100 ASSAY OF PHOSPHORUS: CPT | Performed by: INTERNAL MEDICINE

## 2017-09-25 PROCEDURE — 96372 THER/PROPH/DIAG INJ SC/IM: CPT | Performed by: INTERNAL MEDICINE

## 2017-09-25 PROCEDURE — 85025 COMPLETE CBC W/AUTO DIFF WBC: CPT | Performed by: INTERNAL MEDICINE

## 2017-09-25 PROCEDURE — 83735 ASSAY OF MAGNESIUM: CPT | Performed by: INTERNAL MEDICINE

## 2017-09-25 PROCEDURE — 36415 COLL VENOUS BLD VENIPUNCTURE: CPT | Performed by: INTERNAL MEDICINE

## 2017-09-25 PROCEDURE — 80053 COMPREHEN METABOLIC PANEL: CPT | Performed by: INTERNAL MEDICINE

## 2017-09-25 RX ADMIN — DENOSUMAB 60 MG: 60 INJECTION SUBCUTANEOUS at 11:47

## 2017-09-25 NOTE — PROGRESS NOTES
REASONS FOR FOLLOW-UP:   1.  History of atypical lobular hyperplasia and LCIS of the breast diagnosed        2/2015   2.  Ductal carcinoma in situ, high-grade with necrosis, of the right breast        S/p lumpectomy 1/20/16 and  RT   3.  Chemoprevention with exemestane initiated February 2016   4.  Osteopenia (L hip T-score -2.1) on Prolia Q6 months    HISTORY OF PRESENT ILLNESS:     History of Present Illness  Mrs. Riggs returns today for follow-up of her above history.  Given her high risk breast findings, we initiated chemoprevention with exemestane Feb 2016.    She returned today for review.  She has had no changes on her self breast exam and continues to get imaging and follow-up with Dr. Pierce.    At her last visit, the patient was complaining of pain at the base of the thumbs.  We temporarily discontinued her AI, but she really noted no difference in the arthralgia.  She has since started meloxicam with resolution of the discomfort.    She is scheduled for dental implant in December.      Oncology/Hematology History    1. 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   2.  ductal carcinom a in situ, high grade with focal necrosis of the right breast status post lumpectomy on 01/20/2016 with negative margins; DCIS is ER/SD negative; patient received post-lumpectomy radiation therapy  3. Chemoprevention initiated with Exemestane February 2016        Ductal carcinoma in situ (DCIS) of right breast    3/16/2016 Initial Diagnosis     Ductal carcinoma in situ (DCIS) of right breast          Past Medical History, Past Surgical History, Social History, Family History have been reviewed and are without significant changes except as mentioned.    Review of Systems   A comprehensive 14 point review of systems was performed and was negative  except as mentioned.    Medications:  The current medication list was reviewed in the EMR    ALLERGIES:  No Known Allergies    Objective      There were no vitals filed for this visit.  Current Status 3/20/2017   ECOG score 0       Physical Exam   Constitutional: She is oriented to person, place, and time. She appears well-developed and well-nourished.   HENT:   Head: Normocephalic and atraumatic.   Neurological: She is alert and oriented to person, place, and time.   Skin: Skin is warm and dry.          RECENT LABS:  Hematology WBC   Date Value Ref Range Status   03/20/2017 3.74 (L) 4.00 - 10.00 10*3/mm3 Final     RBC   Date Value Ref Range Status   03/20/2017 4.70 3.90 - 5.00 10*6/mm3 Final     Hemoglobin   Date Value Ref Range Status   03/20/2017 14.2 11.5 - 14.9 g/dL Final     Hematocrit   Date Value Ref Range Status   03/20/2017 43.4 34.0 - 45.0 % Final     Platelets   Date Value Ref Range Status   03/20/2017 179 150 - 375 10*3/mm3 Final      DEXA:   osteopenia T-score -2.1 left hip (11/24/15)    Assessment/Plan       1.  High risk breast lesions including previous lobular hyperplasia and lobular carcinoma in situ of the left breast, followed by ductal carcinoma in situ high-grade of the right breast status post lumpectomy and   radiation therapy.  Given her high risk findings for increased risk of invasive breast cancer, the patient is undergoing chemoprevention with exemestane initiated February 2016.  She continues routine breast surveillance and exams with Dr. Pierce.      3.  Osteopenia:  Given the risk of further bone loss with her aromatase inhibitor, we initiated Prolia 3/18/16. .  We will continue Prolia every 6 months while on aromatase inhibitor therapy.  She will have a BMP, mag, and phos checked prior to each injection.  We will recheck her DEXA scan prior to her 6 month follow-up visit.                    9/25/2017      CC:

## 2017-09-27 RX ORDER — EXEMESTANE 25 MG/1
TABLET ORAL
Qty: 30 TABLET | Refills: 0 | Status: SHIPPED | OUTPATIENT
Start: 2017-09-27 | End: 2017-11-03 | Stop reason: SDUPTHER

## 2017-11-03 RX ORDER — EXEMESTANE 25 MG/1
TABLET ORAL
Qty: 30 TABLET | Refills: 0 | Status: SHIPPED | OUTPATIENT
Start: 2017-11-03 | End: 2017-12-04 | Stop reason: SDUPTHER

## 2017-11-13 ENCOUNTER — TRANSCRIBE ORDERS (OUTPATIENT)
Dept: ADMINISTRATIVE | Facility: HOSPITAL | Age: 72
End: 2017-11-13

## 2017-11-13 DIAGNOSIS — E21.0: Primary | ICD-10-CM

## 2017-11-16 ENCOUNTER — HOSPITAL ENCOUNTER (OUTPATIENT)
Dept: NUCLEAR MEDICINE | Facility: HOSPITAL | Age: 72
Discharge: HOME | End: 2017-11-16

## 2017-11-16 DIAGNOSIS — E21.0: ICD-10-CM

## 2017-11-16 PROCEDURE — 78070 PARATHYROID PLANAR IMAGING: CPT

## 2017-11-16 PROCEDURE — 0 TECHNETIUM SESTAMIBI: Performed by: INTERNAL MEDICINE

## 2017-11-16 PROCEDURE — A9500 TC99M SESTAMIBI: HCPCS | Performed by: INTERNAL MEDICINE

## 2017-11-16 RX ADMIN — TECHNETIUM TC-99M SESTAMIBI 1 DOSE: 1 INJECTION INTRAVENOUS at 06:55

## 2017-11-20 ENCOUNTER — APPOINTMENT (OUTPATIENT)
Dept: WOMENS IMAGING | Facility: HOSPITAL | Age: 72
End: 2017-11-20

## 2017-11-20 PROCEDURE — G0204 DX MAMMO INCL CAD BI: HCPCS | Performed by: RADIOLOGY

## 2017-11-20 PROCEDURE — G0279 TOMOSYNTHESIS, MAMMO: HCPCS | Performed by: RADIOLOGY

## 2017-11-21 ENCOUNTER — TELEPHONE (OUTPATIENT)
Dept: SURGERY | Facility: CLINIC | Age: 72
End: 2017-11-21

## 2017-11-21 NOTE — TELEPHONE ENCOUNTER
[facility] November the 20th 2017 bilateral diagnostic mammogram with 3-D.  The breasts are heterogenous a dense.  Stable postsurgical scar upper outer right breast lumpectomy.  Stable postsurgical scar upper inner left breast.  Stable postsurgical scar lateral left breast.  BI-RADS 2.

## 2017-11-28 DIAGNOSIS — D05.91 CARCINOMA IN SITU OF RIGHT BREAST: ICD-10-CM

## 2017-11-28 DIAGNOSIS — D05.02 LOBULAR CARCINOMA IN SITU OF LEFT BREAST: ICD-10-CM

## 2017-12-04 RX ORDER — EXEMESTANE 25 MG/1
25 TABLET ORAL DAILY
Qty: 30 TABLET | Refills: 3 | Status: SHIPPED | OUTPATIENT
Start: 2017-12-04 | End: 2018-03-16 | Stop reason: SDUPTHER

## 2017-12-13 ENCOUNTER — OFFICE VISIT (OUTPATIENT)
Dept: SURGERY | Facility: CLINIC | Age: 72
End: 2017-12-13

## 2017-12-13 VITALS
HEIGHT: 64 IN | HEART RATE: 89 BPM | WEIGHT: 137.6 LBS | SYSTOLIC BLOOD PRESSURE: 113 MMHG | DIASTOLIC BLOOD PRESSURE: 74 MMHG | OXYGEN SATURATION: 96 % | BODY MASS INDEX: 23.49 KG/M2

## 2017-12-13 DIAGNOSIS — R92.8 ABNORMAL MRI, BREAST: ICD-10-CM

## 2017-12-13 DIAGNOSIS — D05.02 LOBULAR CARCINOMA IN SITU (LCIS) OF LEFT BREAST: ICD-10-CM

## 2017-12-13 DIAGNOSIS — Z12.31 ENCOUNTER FOR SCREENING MAMMOGRAM FOR MALIGNANT NEOPLASM OF BREAST: ICD-10-CM

## 2017-12-13 DIAGNOSIS — D05.11 DUCTAL CARCINOMA IN SITU (DCIS) OF RIGHT BREAST: Primary | ICD-10-CM

## 2017-12-13 DIAGNOSIS — N64.89 RADIAL SCAR OF BREAST: ICD-10-CM

## 2017-12-13 PROCEDURE — 99213 OFFICE O/P EST LOW 20 MIN: CPT | Performed by: SURGERY

## 2017-12-13 NOTE — PROGRESS NOTES
Chief Complaint: Reyna Riggs is a 72 y.o. 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  She noted no masses, skin changes, nipple discharge, nipple changes prior to her most recent imaging.       Her most recent imaging includes:  08/19/09          [facility]        Yearly screening mammogram          Yulia B. Mudge  The breasts are heterogeneously dense.   BIRADS Category 1: Negative     08/27/10        [facility]         Yearly screening mammogram         Yulia B. Mudge   The breasts are heterogeneously dense.   BIRADS Category 1: Negative     09/08/11         [facility]         Yearly screening mammogram        Yulia B Mudge   The breasts are heterogeneously dense.   There is a stable area of architectural distortion with associated post-surgical scar seen in the upper outer region of the left breast. No significant change. Patient has reported has prior excisional left breast biopsy to account for this finding.   BIRADS Category 2: Benign     09/17/12        [facility]         Yearly screening mammogram        Yulia B Mudge   The breasts are heterogeneously dense.   Architectural distortion is in and area of prior excisional biopsy. No significant change.   BIRADS Category 2: Benign     09/19/13        [facility]          Yearly screening mammogram           Yulia B Mudge   The breasts are heterogeneously dense.   Stable area of architectural distortion upper outer region of the left breast.   BIRADS Category 2: Benign     11/12/14          [facility]           BILATERAL SCREENING MAMMOGRAM WITH TOMOSYNTHESIS       Reyna Riggs   The breasts are heterogeneously dense.   Finding 1:  "irregular mass measuring 11 millimeters posterior one-third of the left breast at 9 o'clock, in the central region and in the medial region. This finding is only seen on tomosynthesis.   Finding 2: Area of architectural distortion seen in the lateral region of the left breast.   In the right breast, no masses, significant calcifications or other abnormalities are seen.   IMPRESSION:   Finding 1: Requires additional evaluation.   Finding 2: Requires additional evaluation.   BIRADS Category 0 Incomplete     12/02/14       [facility]           LEFT DIAGNOSTIC MAMMOGRAM WITH TOMOSYNTHESIS       Reyna OSCAR Keely   The breast is heterogeneously dense.   Finding 1: irregular mass left breast 8 millimeters posterior one-third 10:30 o'clock region of the left breast located 6 centimeters from the nipple.   Finding 2: Area of architectural distortion in the left breast does not persist.   LEFT BREAST ULTRASOUND:  Finding 1: irregular taller-than-wide hypoechoic area with indisict margins measuring 5 x 3 x 2 mm.   Finding 2: There is no evidence of any solid mass or abnormal cystic elements.   IMPRESSION:   Finding 1: Suspicious   Finding 2: Benign-Negative   BIRADS Category 4A: Suspicious Abnormality    She had a biopsy on the following day that showed:    12/11/14        [facility]           Left Ultrasound Guided Vacuum Assisted Biopsy                           Reyna MOORE Keely  10:30 left breast  12 gauge  A total of 11 cores a(n) minicork shaped s-shereen tissue marker was placed at the biopsy site.   ADDENDUM 12-16-14:  Returned atypical lobular hyperplasia arising a radial scar.   Post clip mammogram demonstrates good position of the mini \"cork\" shaped clip at the 10:30 position of the left breast with no significant hematoma formation.     12/11/14        [facility]           Pathology Report            Reyna Riggs   Left Breast 10:30:  Atypical lobular hyperplasia arising in radial scar.       She has had  2 excisional breast biopsies  in " the past, LEFT breast, benign, age 42.  She has her uterus and ovaries, is postmenopausal, and takes no hormones.  Her family history includes the following: She has one sister, one brother, 2 daughters, 3 MA, 3 MU, 3 PU, 3 PA. No breast or ovarian cancer in her family.     We called Dr Melton to clarify preoperative CXR mention of tiny hyperdense nodules in the RIGHT lung- he stated not worrisome, no additional imaging or FU needed.    We went to the operating room on 1-7-15 for lumpectomy for excision atypical hyperplasia.    1-7-15 excision ALH returned as LCIS, 1.6 cm, focally present at inferior margin.    In the interim,  Yulia  has done well.  She has noted no changes in her breast exam. No new masses, skin changes,  nipple changes, nipple discharge either breast.   She denies headache, bone pain, belly pain, cough, changes in vision or gait.  She met with Dr Munguia and they decided not to start chemoprevention.      Her most recent imaging includes the followin/15/15            [facility]            LEFT BREAST MAMMOGRAM WITH TOMOSYNTHESIS          Carney A Mudge     post biopsy follow-up.   the breast is heterogeneously dense.   Follow-up focal asymmetry in the left breast, 10:30 o'clock post surgical scar posterior one third medial left breast. In an area of prior excisional biopsy.   IMPRESSION:   Benign Negative  BI-RADS Category 2: Benign     In the interim,  Yulia  has done well.  She has noted no changes in her breast exam. No new masses, skin changes,  nipple changes, nipple discharge either breast.   She denies headache, bone pain, belly pain, cough, changes in vision or gait.    Her most recent imaging includes the followin-13-15       [facility]       Bilateral Screening Mammogram with Tomosynthesis      Mudge, Carney  The breast are heterogenously dense,  stable post- surgical scar medial region of the left breast. in  an area of prior excisional biopsy.  ImpressioN  benign-negative  Birads Category  2:  Benign    11-13-15        [facility]        Bilateral Breast MRi         Reyna Riggs  posterior one third upper inner left breast 10 o'clock there is mild postsurgical architectural distortion.  Within the posterior one third retroareolar region of the right breast 7.3  cm  posterior to   the nipple there is linear, branching enhancement that extends 2.2 cm anterior  to posterior,  0.4 and 1.2 cm in superior to inferior Mammographically, there are no calcifications seen within   this region.  Impression:  1.  Postsurgical site left breast with enhancement of a typically benign reactive character.   The imaging features do not have a suspicious feature but are somewhat indeterminate and the  area is likely best evaluated with a follow up breast MRI in 6 months to 12 months.  2.  Linear, branching enhancement seen in the posterior one third of   retroareolar region of the right breast.  Correlation with an MR guided right breast  biopsy is recommended.  Birads Category 4: Suspicious      I then arranged for a RIGHT breast MRI guided biopsy:    12/01/15                  [facility]              MRI GUIDED MAMMOTOME VACUUM ASSISTED RIGHT BREAST BIOPSY                 Reyna Riggs Yulia   8-gauge   Multiple tissue specimens  Post biopsy mammogram was obtained demonstrating adequate placement of a metallic clip in th eposterior 1/3 retroareolar region of the right breast.   The pathology result has returned as DCIS. This is concordant.     12/01/15                [facility]              RIGHT BREAST MRI BIOPSY            Pathology         Reyna Riggs   Diagnosis:   Right breast tissue, MRI directed biopsies:  Ductal carcinoma in situ, intermediate to high nuclear grade, without necrosis.   Hormone receptor studies pending   maximum span of 3mm.     12/01/15                       [place]                        Reyna Riggs  ER  0%  WA  0%      We went to the operating room on 1-20-16 for bracketted MRI guided needle loc lumpectomy-  pathology returned as 8mm DCIS, focally high grade, focal necrosis, margins, clear-- closest is to superior margin- typographical error in report, called on this and was <1/2 mm from superior margin,  but additional superior margin clear.   Path stage TisN0- stage 0.    She reports some lightning pain in her nipple. Denies any redness, warmth, drainage.      In the interim,  Reyna Riggs  has done well.  She took whole breast plus boost radiation 6 weeks with Dr. Parr from February 29, 2016 through April 8, 2016.  She tolerated this well.    She started Aromasin) Marie up with Dr. Munguia February 2016.  She is noticed a 5 pound weight gain.  Otherwise she feels that she is tolerating a well.    She has noted no changes in her breast exam. No new masses, skin changes, nipple changes, nipple discharge either breast.   She denies headache, bone pain, belly pain, cough, changes in vision or gait.  Her most recent imaging includes the following:  [facility] with [facility] November 14, 2016 heterogenous a dense tissue.  Post surgical scar in both breast.  BI-RADS 2.  [facility] bilateral breast MRI November 16, 2016.  Peripheral enhancement focally prominent 4 mm posterior one third medial left breast.  No finding at the right breast directly site.  Recommend six-month left mammogram and 6 versus 12 month MRI.  BI-RADS 3.      In the interim,  Reyna Riggs  has done well.  She has noted no changes in her breast exam. No new masses, skin changes, nipple changes, nipple discharge either breast.   She denies headache, bone pain, belly pain, cough, changes in vision or gait.    She has had trouble with LEFT hand pain and some arthritis, so she stopped her aromasin in March.  She did not notice a great deal of improvement until she was started on meloxicam by Dr Pallares.    Her most recent imaging includes the following:  [facility] June 1, 2017 bilateral  breast MRI for follow-up abnormal MRI finding.  There is been resolution of all abnormal enhancement in the left breast since prior examination.  BI-RADS 2 benign.  [facility] 2017 left diagnostic mammogram with 3-D.  The breast is heterogenous a dense.  Focal asymmetry posterior one third upper inner left breast at the site of her procedure likely calcified oil cyst or fat necrosis.  Follow up mammogram in 6 months is recommended BI-RADS 3.      Interval History:  In the interim,  Reyna Riggs  has done well.  She has noted no changes in her breast exam. No new masses, skin changes, nipple changes, nipple discharge either breast.   She denies headache, bone pain, belly pain, cough, changes in vision or gait.  Her most recent imaging includes the following:  [facility] November the 20th 2017 bilateral diagnostic mammogram with 3-D.  The breasts are heterogenous a dense.  Stable postsurgical scar upper outer right breast lumpectomy.  Stable postsurgical scar upper inner left breast.  Stable postsurgical scar lateral left breast.  BI-RADS 2.    She restarted the aromasin with meloxicam and is weaning off of the meloxicam.    Her weight is 5 pounds less thatn last visit.        Review of Systems:  Review of Systems   Constitutional: Negative for unexpected weight change (5 lb wt loss).   All other systems reviewed and are negative.       Past Medical and Surgical History:  Breast Biopsy History:  Patient has had the following breast biopsies:2 prior excisional biopsies LEFT breast around age 42. UOQ and lateral.  Breast Cancer HIstory:  Patient does not have a past medical history of breast cancer.  Breast Operations, and year:  None   Obstetric/Gynecologic History:  Age menstrual periods began:15  Patient is postmenopausal, entered menopause naturally at age: 52   Number of pregnancies:2  Number of live births: 2  Number of abortions or miscarriages: 0  Age of delivery of first  "child: 27  Patient did not breast feed.  Length of time taking birth control pills:n/a  Patient has never taken hormone replacement  The patient still has her uterus and ovaries.      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        Prior Hospitalizations, other than for surgery or childbirth, and year:  None     Social History     Social History   • Marital status:      Spouse name: Juan David   • Number of children: 2   • Years of education: College     Occupational History   • Retired [employer]     Worked in Human Resources     Social History Main Topics   • Smoking status: Former Smoker     Packs/day: 1.00     Years: 10.00   • Smokeless tobacco: Never Used   • Alcohol use Yes      Comment: 1 GLASS OF ALCOHOL DAILY   • Drug use: No   • Sexual activity: Not on file     Other Topics Concern   • Not on file     Social History Narrative    She is very active. Enjoys golfing, walking, sewing, etc.     Patient is .  Patient is retired.  Patient drinks 1 servings of caffeine per day.    Family History:  Family History   Problem Relation Age of Onset   • Esophageal cancer Father 74   • Kidney cancer Brother 53   • Diabetes Other    • Diabetes Mother    • Diabetes Sister        Vital Signs:  /74  Pulse 89  Ht 162.6 cm (64\")  Wt 62.4 kg (137 lb 9.6 oz)  SpO2 96%  BMI 23.62 kg/m2     Medications:    Current Outpatient Prescriptions:   •  aspirin 81 MG tablet, Take 81 mg by mouth daily., Disp: , Rfl:   •  Cholecalciferol (VITAMIN D) 1000 UNITS tablet, Take 1,000 Units by mouth daily., Disp: , Rfl:   •  exemestane (AROMASIN) 25 MG chemo " "tablet, Take 1 tablet by mouth Daily., Disp: 30 tablet, Rfl: 3  •  meloxicam (MOBIC) 7.5 MG tablet, Take 7.5 mg by mouth Daily., Disp: , Rfl:   •  Multiple Vitamin (MULTI-VITAMIN PO), Take  by mouth., Disp: , Rfl:   •  sertraline (ZOLOFT) 25 MG tablet, Take 25 mg by mouth daily., Disp: , Rfl:      Allergies:  No Known Allergies    Physical Examination:  /74  Pulse 89  Ht 162.6 cm (64\")  Wt 62.4 kg (137 lb 9.6 oz)  SpO2 96%  BMI 23.62 kg/m2  General Appearance:  Patient is in no distress.  She is well kept and has an average  build.   Psychiatric:  Patient with appropriate mood and affect. Alert and oriented to self, time, and place.    Breast, RIGHT: large  sized, asymmetric just slightly larger than the LEFT breast.   Breast skin is without erythema, edema, rashes.  There are no visible abnormalities upon inspection during the arm-raising maneuver or with hands on hips in the sitting position. There is no nipple retraction, discharge or nipple/areolar skin changes.There are no masses palpable in the sitting or supine positions. There is a well healed RIGHT lateral radial incision  from 1-2016 lumpectomy for DCIS.    Breast, LEFT:  large  sized, asymmetric just slightly smaller than the RIGHT breast .  Breast skin is without erythema, edema, rashes.  There are no visible abnormalities upon inspection during the arm-raising maneuver or with hands on hips in the sitting position. There is no nipple retraction, discharge or nipple/areolar skin changes.There are no masses palpable in the sitting or supine positions. There is a well healed UIQ curvilinear incision from her lumpectomy for ALH/upgrade to LCIS. There is a slight tissue void at the site of lumpectomy.    Lymphatic:  There is no axillary, cervical, infraclavicular, or supraclavicular adenopathy bilaterally.  Eyes:  Pupils are round and reactive to light.  Cardiovascular:  Heart rate and rhythm are regular.  Respiratory:  Lungs are clear bilaterally " with no crackles or wheezes in any lung field.  Gastrointestinal:  Abdomen is soft, nondistended, and nontender.  Musculoskeletal:  Good strength in all 4 extremities.  There is good range of motion in both shoulders.   Skin:  No new skin lesions or rashes on the skin excluding the breast (see breast exam above).        Imagin/12/14          [facility]           BILATERAL SCREENING MAMMOGRAM WITH TOMOSYNTHESIS       [place]chopee A Mudge   The breasts are heterogeneously dense.   Finding 1: irregular mass measuring 11 millimeters posterior one-third of the left breast at 9 o'clock, in the central region and in the medial region. This finding is only seen on tomosynthesis.   Finding 2: Area of architectural distortion seen in the lateral region of the left breast.   In the right breast, no masses, significant calcifications or other abnormalities are seen.   IMPRESSION:   Finding 1: Requires additional evaluation.   Finding 2: Requires additional evaluation.   BIRADS Category 0 Incomplete     14       [facility]           LEFT DIAGNOSTIC MAMMOGRAM WITH TOMOSYNTHESIS       [place]chopee A Mudge   The breast is heterogeneously dense.   Finding 1: irregular mass left breast 8 millimeters posterior one-third 10:30 o'clock region of the left breast located 6 centimeters from the nipple.   Finding 2: Area of architectural distortion in the left breast does not persist.   LEFT BREAST ULTRASOUND:  Finding 1: irregular taller-than-wide hypoechoic area with indisict margins measuring 5 x 3 x 2 mm.   Finding 2: There is no evidence of any solid mass or abnormal cystic elements.   IMPRESSION:   Finding 1: Suspicious   Finding 2: Benign-Negative   BIRADS Category 4A: Suspicious Abnormality  On bedside ultrasound, I can see the biopsy site at the LEFT breast 10;30 5.5 CFN location. Will use needle loc.    07/15/15            [facility]            LEFT BREAST MAMMOGRAM WITH TOMOSYNTHESIS          Ochopee A Mudge   post biopsy follow-up.   the breast is  heterogeneously dense.   Follow-up focal asymmetry in the left breast, 10:30 o'clock post surgical scar posterior one third medial left breast. In an area of prior excisional biopsy.   IMPRESSION:   Benign Negative  BI-RADS Category 2: Benign     11-13-15       [facility]       Bilateral Screening Mammogram with Tomosynthesis      Mudge, Sturdivant  The breast are heterogenously dense,  stable post- surgical scar medial region of the left breast. in  an area of prior excisional biopsy.  ImpressioN  benign-negative  Birads Category 2:  Benign    11-13-15        [facility]        Bilateral Breast MRi         Mudge, Sturdivant  posterior one third upper inner left breast 10 o'clock there is mild postsurgical architectural distortion.  Within the posterior one third retroareolar region of the right breast 7.3  cm  posterior to   the nipple there is linear, branching enhancement that extends 2.2 cm anterior  to posterior,  0.4 and 1.2 cm in superior to inferior Mammographically, there are no calcifications seen within   this region.  Impression:  1.  Postsurgical site left breast with enhancement of a typically benign reactive character.   The imaging features do not have a suspicious feature but are somewhat indeterminate and the  area is likely best evaluated with a follow up breast MRI in 6 months to 12 months.  2.  Linear, branching enhancement seen in the posterior one third of   retroareolar region of the right breast.  Correlation with an MR guided right breast  biopsy is recommended.  Birads Category 4: Suspicious    11-   [facility] BILATERAL SCREENING MAMMOGRAM WITH TOMOSYNTHESIS          MUDGE, MYRTLE  The breasts are heterogeneously dense.  Finding 1. Post-surgical scars upper outer region of both breasts.    Finding 2. Stable post-surgical scar upper inner left breast.    IMPRESSION:  BI-RADS Category 2: Benign    11-16-16                            [facility]                                  BILATERAL BREAST MRI                     "REYNA JONES  IMPRESSION:  1. Post breast conservation therapy changes in the left breast at the 9 o’clock position. I recommend the patient undergo 6 month mammographic follow-up of the left breast and MRI follow-up of both breast in 6 months to 12 months.  2. There are no findings suspicious for malignancy in the right breast.  BI-RADS Category 3: Probably benign    [facility]. The breast is heterogenous leg dense. Fat containing focal asymmetry with postsurgical scar posterior one third upper inner left breast, 8 cm from the nipple. Calcifications are new compatible with oral cyst. Stable postsurgical scar posterior one third upper outer left breast at area of excisional biopsy. BI-RADS 3 recommend 6 month mammogram follow-up left calcifications.    June 5, 2017 bilateral breast MRI no findings suspicious for malignancy in either breast. BI-RADS 2.    [facility] November [facility] 20th 2017 bilateral diagnostic mammogram with 3-D.  The breasts are heterogenous a dense.  Stable postsurgical scar upper outer right breast lumpectomy.  Stable postsurgical scar upper inner left breast.  Stable postsurgical scar lateral left breast.  BI-RADS 2.    Pathology:    12/11/14        [facility]           Left Ultrasound Guided Vacuum Assisted Biopsy                           Reyna Jones  10:30 left breast  12 gauge  A total of 11 cores a(n) minicork shaped s-shereen tissue marker was placed at the biopsy site.   ADDENDUM 12-16-14:  Returned atypical lobular hyperplasia arising a radial scar.   Post clip mammogram demonstrates good position of the mini \"cork\" shaped clip at the 10:30 position of the left breast with no significant hematoma formation.     12/11/14        [facility]           Pathology Report            Reyna Jones   Left Breast 10:30:  Atypical lobular hyperplasia arising in radial scar.     Collected: 1/7/2015  Clinical Diagnosis and History       " "Let breast atypical hyperplasia       Operation: Left Breast Mammo needle loc lumpectomy  for permanent     Diagnosis  \"LEFT BREAST LUMPECTOMY\", WIRE GUIDED:       LOBULAR CARCINOMA IN SITU with pagetoid involvement of ducts.       ESTIMATED EXTENT: 1.6 CM (LCIS PRESENT IN FOUR BLOCKS X 0.4 CM PER BLOCK        = 1.6 CM).       MARGINS FOCALLY INVOLVED (INFERIOR) see note.       FOCAL SCLEROSING FIBROSIS.       SCLEROSING ADENOSIS.       BIOPSY SITE CHANGE.    NOTE: Margins are difficult to evaluate due to extensive cautery artifact.   Recognizable LCIS is present focally at a cauterized inferior margin.  Immunostain for e-cadherin is negative within the carcinoma in situ areas   supportive of lobular carcinoma in situ. Internal and external controls are  appropriate. Reviewed with Dr. LESLEY Gregory who concurs.      12/01/15                  [facility]              MRI GUIDED MAMMOTOME VACUUM ASSISTED RIGHT BREAST BIOPSY                 Reyna Riggs Yulia   8-gauge   Multiple tissue specimens  Post biopsy mammogram was obtained demonstrating adequate placement of a metallic clip in th eposterior 1/3 retroareolar region of the right breast.   The pathology result has returned as DCIS. This is concordant.     12/01/15                [facility]              RIGHT BREAST MRI BIOPSY            Pathology         Reyna Riggs   Diagnosis:   Right breast tissue, MRI directed biopsies:  Ductal carcinoma in situ, intermediate to high nuclear grade, without necrosis.   Hormone receptor studies pending   maximum span of 3mm.     12/01/15                       ER/[place]                        Reyna Riggs  ER  0%  UT  0%      Received: 1/20/2016  Reported: 1/21/2016    Clinical Diagnosis and History       Right breast DCIS.        Oper: Right breast lumpectomy with MRI guided needle IOC and intraoperative  ultrasound.     Diagnosis  1: RIGHT BREAST LUMPECTOMY:   DUCT CARCINOMA IN SITU, INTERMEDIATE TO HIGH-GRADE WITH FOCAL NECROSIS.  ESTIMATED " MAXIMUM DIMENSION OF DUCT CARCINOMA IN SITU IS 8 MM.  DUCT CARCINOMA IN SITU IS LESS THAN d MM FROM SUPERIOR MARGIN.   CHANGES OF PRIOR BIOPSY ARE NOTED.   (FOR ADDITIONAL DETAILS SEE SYNOPTIC REPORT BELOW).    2: ADDITIONAL RIGHT SUPERIOR MARGIN:       BENIGN BREAST TISSUE.     3: ADDITIONAL RIGHT INFERIOR MARGIN:       BENIGN BREAST TISSUE WITH FOCAL APOCRINE METAPLASIA.     4: ADDITIONAL RIGHT MEDIAL MARGIN:       BENIGN BREAST TISSUE WITH FOCAL ADENOSIS.     5: ADDITIONAL RIGHT LATERAL MARGIN:       BENIGN BREAST TISSUE.     6: ADDITIONAL RIGHT DEEP MARGIN:       BENIGN BREAST TISSUE.       SYNOPTIC REPORT (Based on [facility] Cancer Case Summary, version December 2013):       Procedure: Excision with wire-guided localization.        Specimen Laterality: Right.        Size (Extent) of DCIS: Estimated size of DCIS is at least 8 mm.             Comment: This dimension is based on the presence of duct carcinoma  present in two consecutively       obtained tissue blocks each measuring approximately 4 mm in thickness.        Histologic Type: Duct carcinoma in situ.        Nuclear Grade: Focally high grade (grade 3).        Necrosis: Focally present.        Margins: Margins uninvolved by duct carcinoma in situ.             Distance from Closest Margin: Less than d mm from superior margin of  specimen 1. Additionally       excised margins, including superior margin (specimen 2) are free of duct  carcinoma in situ.   Pathologic Staging:            Primary Tumor: pTis (DCIS).             Regional Lymph Nodes: pNX.        Ancillary Studies: ER and WV has been performed on prior biopsy material  ([id number]).       CMK/hmf  IHC/THM      Procedures:      Assessment:   Diagnosis Plan   1. Ductal carcinoma in situ (DCIS) of right breast  MRI Breast Bilateral With & Without Contrast    Mammo Screening Digital Tomosynthesis Bilateral With CAD   2. 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 Radial scar of breast  MRI Breast Bilateral With & Without Contrast    Mammo Screening Digital Tomosynthesis Bilateral With CAD   4. Abnormal MRI, breast  MRI Breast Bilateral With & Without Contrast    Mammo Screening Digital Tomosynthesis Bilateral With CAD   5. Encounter for screening mammogram for malignant neoplasm of breast   Mammo Screening Digital Tomosynthesis Bilateral With CAD     1-  RIGHT breast DCIS  Mammographically occult  Seen on MRI as 2.2 x 1.2 cm, posterior 1/3, RA, 7 CFN,  area of enhancement- MRI guided biopsy returend as DCIS, int to high grade, ER0PR0.  Clinical stage TisN0    1-20-16 bracketted MRI guided needle loc lumpectomy- pathology returned as 8mm DCIS, focally high grade, focal necrosis, margins, clear-- closest is to superior margin- typographical error in report, called on this and was <1/2 mm from superior margin,  but additional superior margin clear.   Path stage TisN0- stage 0.      Dr. Parr from 3/29 2016 through April 8, 2016 whole breast plus boost over 5 and half weeks.  Dr. Munguia February 2016 Aromasin plus prolia- stopped aromasin 3-2017 due to arthralgias notably in hand- some improvement after starting meloxicam- restarted the aromasin    2-3  LEFT breast ALH in a radial scar UIQ- 10:30 5.5 CFN- cork clip-     1-7-15 excision ALH returned as upgrade to  LCIS, 1.6 cm, focally present at inferior margin.  Saw Dr Munguia, declined chemoprevention    4 -  [facility] MRI 4 mm focus of enhancement at excision site, BI-RADS 3- Br2 with complete resolution in 6-2017 MRI      Plan:  Reyna Riggs is doing well today at her followup visit.  She is 2.5 years out from her right breast duct carcinoma in situ and 3-1/2 years out from her excision of left breast atypical lobular hyperplasia.    She continues the aromasin and sees Dr Munguia for FU.    [facility].  We will plan for a bilateral screening mammogram in a bilateral breast MRI for November 2018.  I will see her back after.  I've asked her to continue her self breast exam and to call me interim with any concerns or changes and we'd be happy to see her back sooner.  We did review her surveillance together today.            Shante Pierce MD        We have spent 15 minutes in visit today, 9 in face to face .      Next Appointment:  Return for Next scheduled follow up, after imaging.      EMR Dragon/transcription disclaimer:    Much of this encounter note is an electronic transcription/translocation of spoken language to printed text.  The electronic translation of spoken language may permit erroneous, or at times, nonsensical words or phrases to be inadvertently transcribed.  Although I have reviewed the note from such areas, some may still exist.

## 2017-12-14 ENCOUNTER — HOSPITAL ENCOUNTER (EMERGENCY)
Facility: HOSPITAL | Age: 72
Discharge: HOME | End: 2017-12-14
Attending: EMERGENCY MEDICINE | Admitting: EMERGENCY MEDICINE

## 2017-12-14 VITALS
SYSTOLIC BLOOD PRESSURE: 122 MMHG | OXYGEN SATURATION: 97 % | HEART RATE: 82 BPM | BODY MASS INDEX: 23.39 KG/M2 | RESPIRATION RATE: 16 BRPM | DIASTOLIC BLOOD PRESSURE: 67 MMHG | TEMPERATURE: 98.7 F | HEIGHT: 64 IN | WEIGHT: 137 LBS

## 2017-12-14 DIAGNOSIS — J01.90 ACUTE SINUSITIS, RECURRENCE NOT SPECIFIED, UNSPECIFIED LOCATION: Primary | ICD-10-CM

## 2017-12-14 DIAGNOSIS — R05.9 COUGH IN ADULT: ICD-10-CM

## 2017-12-14 PROCEDURE — 99282 EMERGENCY DEPT VISIT SF MDM: CPT

## 2017-12-14 RX ORDER — GUAIFENESIN AND CODEINE PHOSPHATE 100; 10 MG/5ML; MG/5ML
5 SOLUTION ORAL 3 TIMES DAILY PRN
Qty: 118 ML | Refills: 0 | Status: SHIPPED | OUTPATIENT
Start: 2017-12-14 | End: 2019-04-05

## 2017-12-14 RX ORDER — AMOXICILLIN AND CLAVULANATE POTASSIUM 875; 125 MG/1; MG/1
1 TABLET, FILM COATED ORAL 2 TIMES DAILY
Qty: 20 TABLET | Refills: 0 | Status: SHIPPED | OUTPATIENT
Start: 2017-12-14 | End: 2019-04-05

## 2017-12-14 NOTE — ED PROVIDER NOTES
EMERGENCY DEPARTMENT ENCOUNTER    CHIEF COMPLAINT  Chief Complaint: Sore Throat  History given by: Patient  History limited by:   Room Number: 15/15  PMD: Clara Ann Pallares, MD      HPI:  Pt is a 72 y.o. female who presents complaining of sore throat that has been intermittent for the past 3 months. Pt also reports having a nonproductive cough and generalized myalgias with nausea. She also reports some ear congestion. She reports that sxs became worse yesterday. Pt has had 2x negative strep swabs and has taken Z-pack and tessalon pearls without relief. She has also taken several OTC decongestants without relief. Pt has a hx of breast cancer and is currently on oral chemo.    Duration: 3 months, worse yesterday  Onset: gradual  Timing: intermittent  Location: throat  Quality: sore  Intensity/Severity: moderate  Progression: unchanged  Associated Symptoms: cough (nonproductive), myalgias, nausea, congestion, sinus pressure  Aggravating Factors: none  Alleviating Factors: none  Previous Episodes: none  Treatment before arrival: has taken Z-pack and tessalon pearls without relief. Has also taken several OTC decongestants without relief.     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 12/13/2017     Resolved Ambulatory Problems     Diagnosis Date Noted   • No Resolved Ambulatory Problems     Past Medical History:   Diagnosis Date   • Anxiety    • Atypical lobular hyperplasia of left breast    • Depression    • Ductal carcinoma in situ (DCIS) of right breast    • Lobular carcinoma in situ of left breast        PAST SURGICAL HISTORY  Past Surgical  History:   Procedure Laterality Date   • BREAST LUMPECTOMY Right 01/2016   • BREAST SURGERY  12/2014   • KNEE SURGERY  1978   • SINUS SURGERY  1993       FAMILY HISTORY  Family History   Problem Relation Age of Onset   • Esophageal cancer Father 74   • Kidney cancer Brother 53   • Diabetes Other    • Diabetes Mother    • Diabetes Sister        SOCIAL HISTORY  Social History     Social History   • Marital status:      Spouse name: Juan David   • Number of children: 2   • Years of education: College     Occupational History   • Retired [employer]     Worked in Human Resources     Social History Main Topics   • Smoking status: Former Smoker     Packs/day: 1.00     Years: 10.00   • Smokeless tobacco: Never Used   • Alcohol use Yes      Comment: 1 GLASS OF ALCOHOL DAILY   • Drug use: No   • Sexual activity: Not on file     Other Topics Concern   • Not on file     Social History Narrative    She is very active. Enjoys golfing, walking, sewing, etc.       ALLERGIES  Review of patient's allergies indicates no known allergies.    REVIEW OF SYSTEMS  Review of Systems   Constitutional: Negative for fever.   HENT: Positive for congestion, sinus pressure and sore throat.    Eyes: Negative.    Respiratory: Positive for cough. Negative for shortness of breath.    Cardiovascular: Negative for chest pain.   Gastrointestinal: Positive for nausea. Negative for abdominal pain, diarrhea and vomiting.   Genitourinary: Negative for dysuria.   Musculoskeletal: Negative for neck pain.   Skin: Negative for rash.   Allergic/Immunologic: Negative.    Neurological: Negative for weakness, numbness and headaches.   Hematological: Negative.    Psychiatric/Behavioral: Negative.    All other systems reviewed and are negative.      PHYSICAL EXAM  ED Triage Vitals   Temp Heart Rate Resp BP SpO2   12/14/17 0054 12/14/17 0054 12/14/17 0054 -- 12/14/17 0054   98.7 °F (37.1 °C) 95 16  95 %      Temp src Heart Rate Source Patient Position BP Location  FiO2 (%)   12/14/17 0054 12/14/17 0054 -- -- --   Tympanic Monitor          Physical Exam   Constitutional: She is oriented to person, place, and time and well-developed, well-nourished, and in no distress. No distress.   HENT:   Head: Normocephalic and atraumatic.   Nose: Right sinus exhibits no maxillary sinus tenderness and no frontal sinus tenderness. Left sinus exhibits no maxillary sinus tenderness and no frontal sinus tenderness.   Eyes: EOM are normal. Pupils are equal, round, and reactive to light.   Neck: Normal range of motion. Neck supple.   Cardiovascular: Normal rate, regular rhythm and normal heart sounds.    Pulmonary/Chest: Effort normal and breath sounds normal. No respiratory distress.   Abdominal: Soft. There is no tenderness. There is no rebound and no guarding.   Musculoskeletal: Normal range of motion. She exhibits no edema.   Neurological: She is alert and oriented to person, place, and time. She has normal sensation and normal strength.   Skin: Skin is warm and dry. No rash noted.   Psychiatric: Mood and affect normal.   Nursing note and vitals reviewed.      PROCEDURES  Procedures      PROGRESS AND CONSULTS  ED Course   1:38 AM  Discussed with pt about plan to start on Augmentin for her sxs and discharge. Pt understands and agrees with plan. All concerns addressed.         MEDICAL DECISION MAKING  Results were reviewed/discussed with the patient and they were also made aware of online access. Pt also made aware that some labs, such as cultures, will not be resulted during ER visit and follow up with PMD is necessary.     MDM  Number of Diagnoses or Management Options  Acute sinusitis, recurrence not specified, unspecified location:   Cough in adult:          DIAGNOSIS  Final diagnoses:   Acute sinusitis, recurrence not specified, unspecified location   Cough in adult       DISPOSITION  DISCHARGE    Patient discharged in stable condition.    Reviewed implications of results, diagnosis, meds,  responsibility to follow up, warning signs and symptoms of possible worsening, potential complications and reasons to return to ER.    Patient/Family voiced understanding of above instructions.    Discussed plan for discharge, as there is no emergent indication for admission.  Pt/family is agreeable and understands need for follow up and repeat testing.  Pt is aware that discharge does not mean that nothing is wrong but it indicates no emergency is present that requires admission and they must continue care with follow-up as given below or physician of their choice.     FOLLOW-UP  Clara Ann Pallares, MD  [address]  [address]  [facility] [address]  [phone]    Schedule an appointment as soon as possible for a visit           Medication List      New Prescriptions          amoxicillin-clavulanate 875-125 MG per tablet   Commonly known as:  AUGMENTIN   Take 1 tablet by mouth 2 (Two) Times a Day.       guaifenesin-codeine 100-10 MG/5ML liquid   Commonly known as:  GUAIFENESIN AC   Take 5 mL by mouth 3 (Three) Times a Day As Needed for Cough.               Latest Documented Vital Signs:  As of 2:01 AM  BP-   HR- 95 Temp- 98.7 °F (37.1 °C) (Tympanic) O2 sat- 95%    --  Documentation assistance provided by antonella Roberto for Dr. Quiles.  Information recorded by the scribe was done at my direction and has been verified and validated by me.     Pravin Roberto  12/14/17 0141       Tono Quiles MD  12/14/17 0202

## 2017-12-14 NOTE — ED NOTES
Pt reports that she has had cough, sore throat, and body aches, and nauseated. Pt reports this she has been dealing with this off and on since September.      Farrah Atkinson RN  12/14/17 0054

## 2018-03-16 RX ORDER — EXEMESTANE 25 MG/1
TABLET ORAL
Qty: 30 TABLET | Refills: 2 | Status: SHIPPED | OUTPATIENT
Start: 2018-03-16 | End: 2018-06-16 | Stop reason: SDUPTHER

## 2018-03-22 ENCOUNTER — HOSPITAL ENCOUNTER (OUTPATIENT)
Dept: BONE DENSITY | Facility: HOSPITAL | Age: 73
Discharge: HOME | End: 2018-03-22
Attending: INTERNAL MEDICINE | Admitting: INTERNAL MEDICINE

## 2018-03-22 DIAGNOSIS — D05.02 LOBULAR CARCINOMA IN SITU OF LEFT BREAST: ICD-10-CM

## 2018-03-22 DIAGNOSIS — M85.80 OSTEOPENIA: ICD-10-CM

## 2018-03-22 DIAGNOSIS — D05.91 CARCINOMA IN SITU OF RIGHT BREAST: ICD-10-CM

## 2018-03-22 DIAGNOSIS — M85.821 OTHER SPECIFIED DISORDERS OF BONE DENSITY AND STRUCTURE, RIGHT UPPER ARM: ICD-10-CM

## 2018-03-22 PROCEDURE — 77080 DXA BONE DENSITY AXIAL: CPT

## 2018-03-28 DIAGNOSIS — M85.80 OSTEOPENIA, UNSPECIFIED LOCATION: ICD-10-CM

## 2018-03-30 ENCOUNTER — INFUSION (OUTPATIENT)
Dept: ONCOLOGY | Facility: HOSPITAL | Age: 73
End: 2018-03-30

## 2018-03-30 ENCOUNTER — OFFICE VISIT (OUTPATIENT)
Dept: ONCOLOGY | Facility: CLINIC | Age: 73
End: 2018-03-30

## 2018-03-30 ENCOUNTER — LAB (OUTPATIENT)
Dept: LAB | Facility: HOSPITAL | Age: 73
End: 2018-03-30

## 2018-03-30 VITALS
HEIGHT: 63 IN | OXYGEN SATURATION: 98 % | SYSTOLIC BLOOD PRESSURE: 112 MMHG | HEART RATE: 78 BPM | RESPIRATION RATE: 16 BRPM | DIASTOLIC BLOOD PRESSURE: 70 MMHG | BODY MASS INDEX: 24.41 KG/M2 | WEIGHT: 137.8 LBS | TEMPERATURE: 98.1 F

## 2018-03-30 DIAGNOSIS — D05.91 CARCINOMA IN SITU OF RIGHT BREAST, UNSPECIFIED TYPE: ICD-10-CM

## 2018-03-30 DIAGNOSIS — D05.11 DUCTAL CARCINOMA IN SITU (DCIS) OF RIGHT BREAST: Primary | ICD-10-CM

## 2018-03-30 DIAGNOSIS — E83.52 HYPERCALCEMIA: ICD-10-CM

## 2018-03-30 DIAGNOSIS — M85.80 OSTEOPENIA, UNSPECIFIED LOCATION: ICD-10-CM

## 2018-03-30 DIAGNOSIS — M85.80 OSTEOPENIA, UNSPECIFIED LOCATION: Primary | ICD-10-CM

## 2018-03-30 LAB
ALBUMIN SERPL-MCNC: 4.3 G/DL (ref 3.5–5.2)
ALBUMIN/GLOB SERPL: 1.5 G/DL (ref 1.1–2.4)
ALP SERPL-CCNC: 51 U/L (ref 38–116)
ALT SERPL W P-5'-P-CCNC: 20 U/L (ref 0–33)
ANION GAP SERPL CALCULATED.3IONS-SCNC: 9.6 MMOL/L
AST SERPL-CCNC: 24 U/L (ref 0–32)
BASOPHILS # BLD AUTO: 0.05 10*3/MM3 (ref 0–0.1)
BASOPHILS NFR BLD AUTO: 1.4 % (ref 0–1.1)
BILIRUB SERPL-MCNC: 0.4 MG/DL (ref 0.1–1.2)
BUN BLD-MCNC: 22 MG/DL (ref 6–20)
BUN/CREAT SERPL: 27.2 (ref 7.3–30)
CALCIUM SPEC-SCNC: 10.5 MG/DL (ref 8.5–10.2)
CHLORIDE SERPL-SCNC: 102 MMOL/L (ref 98–107)
CO2 SERPL-SCNC: 30.4 MMOL/L (ref 22–29)
CREAT BLD-MCNC: 0.81 MG/DL (ref 0.6–1.1)
DEPRECATED RDW RBC AUTO: 45 FL (ref 37–49)
EOSINOPHIL # BLD AUTO: 0.32 10*3/MM3 (ref 0–0.36)
EOSINOPHIL NFR BLD AUTO: 9.2 % (ref 1–5)
ERYTHROCYTE [DISTWIDTH] IN BLOOD BY AUTOMATED COUNT: 13.3 % (ref 11.7–14.5)
GFR SERPL CREATININE-BSD FRML MDRD: 70 ML/MIN/1.73
GLOBULIN UR ELPH-MCNC: 2.9 GM/DL (ref 1.8–3.5)
GLUCOSE BLD-MCNC: 133 MG/DL (ref 74–124)
HCT VFR BLD AUTO: 43.3 % (ref 34–45)
HGB BLD-MCNC: 14.3 G/DL (ref 11.5–14.9)
IMM GRANULOCYTES # BLD: 0.01 10*3/MM3 (ref 0–0.03)
IMM GRANULOCYTES NFR BLD: 0.3 % (ref 0–0.5)
LYMPHOCYTES # BLD AUTO: 0.64 10*3/MM3 (ref 1–3.5)
LYMPHOCYTES NFR BLD AUTO: 18.4 % (ref 20–49)
MAGNESIUM SERPL-MCNC: 1.9 MG/DL (ref 1.8–2.5)
MCH RBC QN AUTO: 30.8 PG (ref 27–33)
MCHC RBC AUTO-ENTMCNC: 33 G/DL (ref 32–35)
MCV RBC AUTO: 93.1 FL (ref 83–97)
MONOCYTES # BLD AUTO: 0.29 10*3/MM3 (ref 0.25–0.8)
MONOCYTES NFR BLD AUTO: 8.4 % (ref 4–12)
NEUTROPHILS # BLD AUTO: 2.16 10*3/MM3 (ref 1.5–7)
NEUTROPHILS NFR BLD AUTO: 62.3 % (ref 39–75)
NRBC BLD MANUAL-RTO: 0 /100 WBC (ref 0–0)
PHOSPHATE SERPL-MCNC: 2.2 MG/DL (ref 2.5–4.5)
PLATELET # BLD AUTO: 207 10*3/MM3 (ref 150–375)
PMV BLD AUTO: 9.8 FL (ref 8.9–12.1)
POTASSIUM BLD-SCNC: 4.1 MMOL/L (ref 3.5–4.7)
PROT SERPL-MCNC: 7.2 G/DL (ref 6.3–8)
RBC # BLD AUTO: 4.65 10*6/MM3 (ref 3.9–5)
SODIUM BLD-SCNC: 142 MMOL/L (ref 134–145)
WBC NRBC COR # BLD: 3.47 10*3/MM3 (ref 4–10)

## 2018-03-30 PROCEDURE — 83735 ASSAY OF MAGNESIUM: CPT | Performed by: INTERNAL MEDICINE

## 2018-03-30 PROCEDURE — 36415 COLL VENOUS BLD VENIPUNCTURE: CPT | Performed by: INTERNAL MEDICINE

## 2018-03-30 PROCEDURE — 25010000002 DENOSUMAB 60 MG/ML SOLUTION: Performed by: INTERNAL MEDICINE

## 2018-03-30 PROCEDURE — 85025 COMPLETE CBC W/AUTO DIFF WBC: CPT | Performed by: INTERNAL MEDICINE

## 2018-03-30 PROCEDURE — 96372 THER/PROPH/DIAG INJ SC/IM: CPT | Performed by: INTERNAL MEDICINE

## 2018-03-30 PROCEDURE — 80053 COMPREHEN METABOLIC PANEL: CPT | Performed by: INTERNAL MEDICINE

## 2018-03-30 PROCEDURE — 84100 ASSAY OF PHOSPHORUS: CPT | Performed by: INTERNAL MEDICINE

## 2018-03-30 PROCEDURE — 99214 OFFICE O/P EST MOD 30 MIN: CPT | Performed by: INTERNAL MEDICINE

## 2018-03-30 RX ADMIN — DENOSUMAB 60 MG: 60 INJECTION SUBCUTANEOUS at 09:31

## 2018-03-30 NOTE — PROGRESS NOTES
REASONS FOR FOLLOW-UP:   1.  History of atypical lobular hyperplasia and LCIS of the breast diagnosed        2/2015   2.  Ductal carcinoma in situ, high-grade with necrosis, of the right breast        S/p lumpectomy 1/20/16 and  RT   3.  Chemoprevention with exemestane initiated February 2016   4.  Osteopenia (L hip T-score -2.1) on Prolia Q6 months    HISTORY OF PRESENT ILLNESS:     History of Present Illness  Mrs. Riggs returns today for follow-up of her above history.  She initiated chemoprevention with exemestane Feb 2016.    In return today she is doing well.  She takes meloxicam for base of thumb pain with good results.  She has no major side effects of exemestane including no significant vasomotor symptoms.  She is up-to-date on breast imaging and exam with Dr. Pierce.    She has chronic mild hypercalcemia evaluated by her primary care physician including a parathyroid scan.  I do not see results of a protein electrophoresis in the chart.      Oncology/Hematology History    1. 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   2.  ductal carcinom a in situ, high grade with focal necrosis of the right breast status post lumpectomy on 01/20/2016 with negative margins; DCIS is ER/[place] negative; patient received post-lumpectomy radiation therapy  3. Chemoprevention initiated with Exemestane February 2016        Ductal carcinoma in situ (DCIS) of right breast    3/16/2016 Initial Diagnosis     Ductal carcinoma in situ (DCIS) of right breast          Past Medical History, Past Surgical History, Social History, Family History have been reviewed and are without significant changes except as mentioned.    Review of Systems   Constitutional: Negative.    Respiratory: Negative.    Cardiovascular: Negative.    Gastrointestinal: Negative.   "  Genitourinary: Negative.    Musculoskeletal: Positive for arthralgias.   Skin: Negative.    Neurological: Negative.    Hematological: Negative.       A comprehensive 14 point review of systems was performed and was negative except as mentioned.    Medications:  The current medication list was reviewed in the EMR    ALLERGIES:  No Known Allergies    Objective      Vitals:    03/30/18 0903   BP: 112/70   Pulse: 78   Resp: 16   Temp: 98.1 °F (36.7 °C)   TempSrc: Oral   SpO2: 98%   Weight: 62.5 kg (137 lb 12.8 oz)   Height: 161 cm (63.39\")   PainSc: 0-No pain     Current Status 3/30/2018   ECOG score 0       Physical Exam   Constitutional: She is oriented to person, place, and time. She appears well-developed and well-nourished.   HENT:   Head: Normocephalic and atraumatic.   Cardiovascular: Normal rate and regular rhythm.    Pulmonary/Chest: Effort normal. No respiratory distress. She has no wheezes.   Abdominal: She exhibits no distension.   Neurological: She is alert and oriented to person, place, and time.   Skin: Skin is warm and dry.   Psychiatric: She has a normal mood and affect.          RECENT LABS:  Hematology WBC   Date Value Ref Range Status   03/30/2018 3.47 (L) 4.00 - 10.00 10*3/mm3 Final     RBC   Date Value Ref Range Status   03/30/2018 4.65 3.90 - 5.00 10*6/mm3 Final     Hemoglobin   Date Value Ref Range Status   03/30/2018 14.3 11.5 - 14.9 g/dL Final     Hematocrit   Date Value Ref Range Status   03/30/2018 43.3 34.0 - 45.0 % Final     Platelets   Date Value Ref Range Status   03/30/2018 207 150 - 375 10*3/mm3 Final      DEXA:   osteopenia T-score -2.0 left hip on 3/22/18    Assessment/Plan       1.  High risk breast lesions including previous lobular hyperplasia and lobular carcinoma in situ of the left breast, followed by ductal carcinoma in situ high-grade of the right breast status post lumpectomy and  radiation therapy.  Given her high risk findings for increased risk of invasive breast cancer, " the patient is undergoing chemoprevention with exemestane initiated February 2016.  She continues routine breast surveillance and exams with Dr. Pierce; she will be having MRI of the breast for screening in November.      3.  Osteopenia:  Given the risk of further bone loss with her aromatase inhibitor, we initiated Prolia 3/18/16. .  We will continue Prolia every 6 months while on aromatase inhibitor therapy.  She will have a BMP, mag, and phos checked prior to each injection.  Her DEXA scan reviewed today shows stable osteopenia with a T score -2.0 in the left hip.  This is unchanged from her DEXA 2 years ago.    4.  chronic mild hypercalcemia:  this has been evaluated by her PCP.  I do not see results of protein electrophoresis in the chart so next visit I will check a protein electrophoresis with immunofixation and a free light chain ratio to rule out paraproteinemias.  The patient has not had invasive breast cancer so metastatic disease is unlikely.                  3/30/2018      CC:

## 2018-06-18 RX ORDER — EXEMESTANE 25 MG/1
TABLET ORAL
Qty: 30 TABLET | Refills: 0 | Status: SHIPPED | OUTPATIENT
Start: 2018-06-18 | End: 2019-01-02 | Stop reason: SDUPTHER

## 2018-07-06 RX ORDER — EXEMESTANE 25 MG/1
TABLET ORAL
Qty: 30 TABLET | Refills: 5 | Status: SHIPPED | OUTPATIENT
Start: 2018-07-06 | End: 2019-10-04 | Stop reason: SDUPTHER

## 2018-09-11 ENCOUNTER — TELEPHONE (OUTPATIENT)
Dept: SURGERY | Facility: CLINIC | Age: 73
End: 2018-09-11

## 2018-09-11 NOTE — TELEPHONE ENCOUNTER
mammo scheduled 11.21.18 arrive 12:45pm @ [facility]  Mri same day arrive 2:15pm @ [facility]    F/u 11.27.18 arrive 2:15pm    lft vm    rr

## 2018-09-27 ENCOUNTER — DOCUMENTATION (OUTPATIENT)
Dept: ONCOLOGY | Facility: CLINIC | Age: 73
End: 2018-09-27

## 2018-09-27 DIAGNOSIS — D05.11 DUCTAL CARCINOMA IN SITU (DCIS) OF RIGHT BREAST: Primary | ICD-10-CM

## 2018-09-27 DIAGNOSIS — M85.80 OSTEOPENIA, UNSPECIFIED LOCATION: ICD-10-CM

## 2018-09-27 NOTE — PROGRESS NOTES
Patient is scheduled to see Dr. Munguia and receive Prolia tomorrow, however it is too soon for her to receive it.  Dr. Munguia was notified and requested she be notified to see if she wants to have appointments moved out or if she wants to come in to see him and receive Prolia when it is due.  Message was left for her to call me back in order to reschedule Prolia.

## 2018-09-28 ENCOUNTER — APPOINTMENT (OUTPATIENT)
Dept: ONCOLOGY | Facility: CLINIC | Age: 73
End: 2018-09-28

## 2018-09-28 ENCOUNTER — APPOINTMENT (OUTPATIENT)
Dept: ONCOLOGY | Facility: HOSPITAL | Age: 73
End: 2018-09-28

## 2018-09-28 ENCOUNTER — APPOINTMENT (OUTPATIENT)
Dept: LAB | Facility: HOSPITAL | Age: 73
End: 2018-09-28

## 2018-10-05 ENCOUNTER — INFUSION (OUTPATIENT)
Dept: ONCOLOGY | Facility: HOSPITAL | Age: 73
End: 2018-10-05

## 2018-10-05 ENCOUNTER — LAB (OUTPATIENT)
Dept: LAB | Facility: HOSPITAL | Age: 73
End: 2018-10-05

## 2018-10-05 DIAGNOSIS — M85.80 OSTEOPENIA, UNSPECIFIED LOCATION: Primary | ICD-10-CM

## 2018-10-05 DIAGNOSIS — M85.80 OSTEOPENIA, UNSPECIFIED LOCATION: ICD-10-CM

## 2018-10-05 DIAGNOSIS — D05.11 DUCTAL CARCINOMA IN SITU (DCIS) OF RIGHT BREAST: ICD-10-CM

## 2018-10-05 LAB
ALBUMIN SERPL-MCNC: 4.4 G/DL (ref 3.5–5.2)
ALBUMIN/GLOB SERPL: 1.9 G/DL (ref 1.1–2.4)
ALP SERPL-CCNC: 51 U/L (ref 38–116)
ALT SERPL W P-5'-P-CCNC: 15 U/L (ref 0–33)
ANION GAP SERPL CALCULATED.3IONS-SCNC: 7.5 MMOL/L
AST SERPL-CCNC: 22 U/L (ref 0–32)
BASOPHILS # BLD AUTO: 0.03 10*3/MM3 (ref 0–0.1)
BASOPHILS NFR BLD AUTO: 0.7 % (ref 0–1.1)
BILIRUB SERPL-MCNC: 0.4 MG/DL (ref 0.1–1.2)
BUN BLD-MCNC: 20 MG/DL (ref 6–20)
BUN/CREAT SERPL: 27.4 (ref 7.3–30)
CALCIUM SPEC-SCNC: 10.6 MG/DL (ref 8.5–10.2)
CHLORIDE SERPL-SCNC: 104 MMOL/L (ref 98–107)
CO2 SERPL-SCNC: 29.5 MMOL/L (ref 22–29)
CREAT BLD-MCNC: 0.73 MG/DL (ref 0.6–1.1)
DEPRECATED RDW RBC AUTO: 45.2 FL (ref 37–49)
EOSINOPHIL # BLD AUTO: 0.31 10*3/MM3 (ref 0–0.36)
EOSINOPHIL NFR BLD AUTO: 7.7 % (ref 1–5)
ERYTHROCYTE [DISTWIDTH] IN BLOOD BY AUTOMATED COUNT: 13 % (ref 11.7–14.5)
GFR SERPL CREATININE-BSD FRML MDRD: 79 ML/MIN/1.73
GLOBULIN UR ELPH-MCNC: 2.3 GM/DL (ref 1.8–3.5)
GLUCOSE BLD-MCNC: 88 MG/DL (ref 74–124)
HCT VFR BLD AUTO: 42.2 % (ref 34–45)
HGB BLD-MCNC: 13.7 G/DL (ref 11.5–14.9)
IMM GRANULOCYTES # BLD: 0.01 10*3/MM3 (ref 0–0.03)
IMM GRANULOCYTES NFR BLD: 0.2 % (ref 0–0.5)
LYMPHOCYTES # BLD AUTO: 0.73 10*3/MM3 (ref 1–3.5)
LYMPHOCYTES NFR BLD AUTO: 18.2 % (ref 20–49)
MAGNESIUM SERPL-MCNC: 2.1 MG/DL (ref 1.8–2.5)
MCH RBC QN AUTO: 30.9 PG (ref 27–33)
MCHC RBC AUTO-ENTMCNC: 32.5 G/DL (ref 32–35)
MCV RBC AUTO: 95.3 FL (ref 83–97)
MONOCYTES # BLD AUTO: 0.42 10*3/MM3 (ref 0.25–0.8)
MONOCYTES NFR BLD AUTO: 10.4 % (ref 4–12)
NEUTROPHILS # BLD AUTO: 2.52 10*3/MM3 (ref 1.5–7)
NEUTROPHILS NFR BLD AUTO: 62.8 % (ref 39–75)
NRBC BLD MANUAL-RTO: 0 /100 WBC (ref 0–0)
PHOSPHATE SERPL-MCNC: 2.7 MG/DL (ref 2.5–4.5)
PLATELET # BLD AUTO: 180 10*3/MM3 (ref 150–375)
PMV BLD AUTO: 9.7 FL (ref 8.9–12.1)
POTASSIUM BLD-SCNC: 4.6 MMOL/L (ref 3.5–4.7)
PROT SERPL-MCNC: 6.7 G/DL (ref 6.3–8)
RBC # BLD AUTO: 4.43 10*6/MM3 (ref 3.9–5)
SODIUM BLD-SCNC: 141 MMOL/L (ref 134–145)
WBC NRBC COR # BLD: 4.02 10*3/MM3 (ref 4–10)

## 2018-10-05 PROCEDURE — 25010000002 DENOSUMAB 60 MG/ML SOLUTION: Performed by: INTERNAL MEDICINE

## 2018-10-05 PROCEDURE — 85025 COMPLETE CBC W/AUTO DIFF WBC: CPT | Performed by: INTERNAL MEDICINE

## 2018-10-05 PROCEDURE — 84100 ASSAY OF PHOSPHORUS: CPT | Performed by: INTERNAL MEDICINE

## 2018-10-05 PROCEDURE — 36415 COLL VENOUS BLD VENIPUNCTURE: CPT | Performed by: INTERNAL MEDICINE

## 2018-10-05 PROCEDURE — 96372 THER/PROPH/DIAG INJ SC/IM: CPT | Performed by: INTERNAL MEDICINE

## 2018-10-05 PROCEDURE — 83735 ASSAY OF MAGNESIUM: CPT | Performed by: INTERNAL MEDICINE

## 2018-10-05 PROCEDURE — 80053 COMPREHEN METABOLIC PANEL: CPT | Performed by: INTERNAL MEDICINE

## 2018-10-05 RX ADMIN — DENOSUMAB 60 MG: 60 INJECTION SUBCUTANEOUS at 10:00

## 2018-10-08 LAB
ALBUMIN SERPL-MCNC: 3.8 G/DL (ref 2.9–4.4)
ALBUMIN/GLOB SERPL: 1.5 {RATIO} (ref 0.7–1.7)
ALPHA1 GLOB FLD ELPH-MCNC: 0.2 G/DL (ref 0–0.4)
ALPHA2 GLOB SERPL ELPH-MCNC: 0.7 G/DL (ref 0.4–1)
B-GLOBULIN SERPL ELPH-MCNC: 1 G/DL (ref 0.7–1.3)
GAMMA GLOB SERPL ELPH-MCNC: 0.8 G/DL (ref 0.4–1.8)
GLOBULIN SER CALC-MCNC: 2.7 G/DL (ref 2.2–3.9)
IGA SERPL-MCNC: 130 MG/DL (ref 87–352)
IGG SERPL-MCNC: 826 MG/DL (ref 700–1600)
IGM SERPL-MCNC: 64 MG/DL (ref 26–217)
INTERPRETATION SERPL IEP-IMP: NORMAL
KAPPA LC SERPL-MCNC: 10.8 MG/L (ref 3.3–19.4)
KAPPA LC/LAMBDA SER: 0.99 {RATIO} (ref 0.26–1.65)
LAMBDA LC FREE SERPL-MCNC: 10.9 MG/L (ref 5.7–26.3)
Lab: NORMAL
M-SPIKE: NORMAL G/DL
PROT SERPL-MCNC: 6.5 G/DL (ref 6–8.5)

## 2018-11-02 ENCOUNTER — LAB (OUTPATIENT)
Dept: LAB | Facility: HOSPITAL | Age: 73
End: 2018-11-02

## 2018-11-02 ENCOUNTER — OFFICE VISIT (OUTPATIENT)
Dept: ONCOLOGY | Facility: CLINIC | Age: 73
End: 2018-11-02

## 2018-11-02 VITALS
HEIGHT: 63 IN | BODY MASS INDEX: 24.41 KG/M2 | RESPIRATION RATE: 16 BRPM | OXYGEN SATURATION: 99 % | TEMPERATURE: 97.5 F | DIASTOLIC BLOOD PRESSURE: 70 MMHG | HEART RATE: 66 BPM | WEIGHT: 137.8 LBS | SYSTOLIC BLOOD PRESSURE: 130 MMHG

## 2018-11-02 DIAGNOSIS — M85.80 OSTEOPENIA, UNSPECIFIED LOCATION: ICD-10-CM

## 2018-11-02 DIAGNOSIS — E83.52 HYPERCALCEMIA: Primary | ICD-10-CM

## 2018-11-02 DIAGNOSIS — E83.52 HYPERCALCEMIA: ICD-10-CM

## 2018-11-02 DIAGNOSIS — D05.11 DUCTAL CARCINOMA IN SITU (DCIS) OF RIGHT BREAST: Primary | ICD-10-CM

## 2018-11-02 DIAGNOSIS — D05.02 LOBULAR CARCINOMA IN SITU OF LEFT BREAST: ICD-10-CM

## 2018-11-02 LAB
BASOPHILS # BLD AUTO: 0.05 10*3/MM3 (ref 0–0.1)
BASOPHILS NFR BLD AUTO: 0.9 % (ref 0–1.1)
DEPRECATED RDW RBC AUTO: 43.4 FL (ref 37–49)
EOSINOPHIL # BLD AUTO: 0.32 10*3/MM3 (ref 0–0.36)
EOSINOPHIL NFR BLD AUTO: 5.7 % (ref 1–5)
ERYTHROCYTE [DISTWIDTH] IN BLOOD BY AUTOMATED COUNT: 12.6 % (ref 11.7–14.5)
HCT VFR BLD AUTO: 43.8 % (ref 34–45)
HGB BLD-MCNC: 14.4 G/DL (ref 11.5–14.9)
IMM GRANULOCYTES # BLD: 0.01 10*3/MM3 (ref 0–0.03)
IMM GRANULOCYTES NFR BLD: 0.2 % (ref 0–0.5)
LYMPHOCYTES # BLD AUTO: 1.12 10*3/MM3 (ref 1–3.5)
LYMPHOCYTES NFR BLD AUTO: 20.1 % (ref 20–49)
MCH RBC QN AUTO: 30.8 PG (ref 27–33)
MCHC RBC AUTO-ENTMCNC: 32.9 G/DL (ref 32–35)
MCV RBC AUTO: 93.8 FL (ref 83–97)
MONOCYTES # BLD AUTO: 0.41 10*3/MM3 (ref 0.25–0.8)
MONOCYTES NFR BLD AUTO: 7.3 % (ref 4–12)
NEUTROPHILS # BLD AUTO: 3.67 10*3/MM3 (ref 1.5–7)
NEUTROPHILS NFR BLD AUTO: 65.8 % (ref 39–75)
NRBC BLD MANUAL-RTO: 0 /100 WBC (ref 0–0)
PLATELET # BLD AUTO: 228 10*3/MM3 (ref 150–375)
PMV BLD AUTO: 9.3 FL (ref 8.9–12.1)
RBC # BLD AUTO: 4.67 10*6/MM3 (ref 3.9–5)
WBC NRBC COR # BLD: 5.58 10*3/MM3 (ref 4–10)

## 2018-11-02 PROCEDURE — 85025 COMPLETE CBC W/AUTO DIFF WBC: CPT | Performed by: INTERNAL MEDICINE

## 2018-11-02 PROCEDURE — 36415 COLL VENOUS BLD VENIPUNCTURE: CPT | Performed by: INTERNAL MEDICINE

## 2018-11-02 PROCEDURE — 99214 OFFICE O/P EST MOD 30 MIN: CPT | Performed by: INTERNAL MEDICINE

## 2018-11-02 NOTE — PROGRESS NOTES
REASONS FOR FOLLOW-UP:   1.  History of atypical lobular hyperplasia and LCIS of the breast diagnosed        2/2015   2.  Ductal carcinoma in situ, high-grade with necrosis, of the right breast        S/p lumpectomy 1/20/16 and  RT   3.  Chemoprevention with exemestane initiated February 2016   4.  Osteopenia (L hip T-score -2.1) on Prolia Q6 months    HISTORY OF PRESENT ILLNESS:     History of Present Illness  Mrs. Riggs returns today for follow-up of her above history.  She initiated chemoprevention with exemestane Feb 2016.    In return today, she is doing well with no changes in her self breast exam.  She tolerates AI with mild vasomotor symptoms.  She continues to mild hypercalcemia-I checked a protein electrophoresis and immunofixation plus free light chain ratio all negative.      Oncology/Hematology History    1. 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   2.  ductal carcinom a in situ, high grade with focal necrosis of the right breast status post lumpectomy on 01/20/2016 with negative margins; DCIS is ER/[place] negative; patient received post-lumpectomy radiation therapy  3. Chemoprevention initiated with Exemestane February 2016        Ductal carcinoma in situ (DCIS) of right breast    3/16/2016 Initial Diagnosis     Ductal carcinoma in situ (DCIS) of right breast          Past Medical History, Past Surgical History, Social History, Family History have been reviewed and are without significant changes except as mentioned.    Review of Systems   Constitutional: Negative.    Respiratory: Negative.    Cardiovascular: Negative.    Gastrointestinal: Negative.    Genitourinary: Negative.    Musculoskeletal: Positive for arthralgias.   Skin: Negative.    Neurological: Negative.    Hematological: Negative.       ROS  "unchanged-11/2/18    Medications:  The current medication list was reviewed in the EMR    ALLERGIES:  No Known Allergies    Objective      Vitals:    11/02/18 1348   BP: 130/70   Pulse: 66   Resp: 16   Temp: 97.5 °F (36.4 °C)   SpO2: 99%   Weight: 62.5 kg (137 lb 12.8 oz)   Height: 161 cm (63.39\")   PainSc: 0-No pain     Current Status 11/2/2018   ECOG score 0       Physical Exam   Constitutional: She is oriented to person, place, and time. She appears well-developed and well-nourished.   HENT:   Head: Normocephalic and atraumatic.   Cardiovascular: Normal rate and regular rhythm.    Pulmonary/Chest: Effort normal. No respiratory distress. She has no wheezes.   Abdominal: She exhibits no distension.   Neurological: She is alert and oriented to person, place, and time.   Skin: Skin is warm and dry.   Psychiatric: She has a normal mood and affect.      Exam unchanged-11/2/18    RECENT LABS:  Hematology WBC   Date Value Ref Range Status   11/02/2018 5.58 4.00 - 10.00 10*3/mm3 Final     RBC   Date Value Ref Range Status   11/02/2018 4.67 3.90 - 5.00 10*6/mm3 Final     Hemoglobin   Date Value Ref Range Status   11/02/2018 14.4 11.5 - 14.9 g/dL Final     Hematocrit   Date Value Ref Range Status   11/02/2018 43.8 34.0 - 45.0 % Final     Platelets   Date Value Ref Range Status   11/02/2018 228 150 - 375 10*3/mm3 Final      DEXA:   osteopenia T-score -2.0 left hip on 3/22/18    Assessment/Plan       1.  High risk breast lesions including previous lobular hyperplasia and lobular carcinoma in situ of the left breast, followed by ductal carcinoma in situ high-grade of the right breast status post lumpectomy and  radiation therapy.  Given her high risk findings for increased risk of invasive breast cancer, the patient is undergoing chemoprevention with exemestane initiated February 2016 (prescription drug management).  She continues routine breast surveillance and exams with Dr. Pierce; she will be having MRI of the breast for " screening in later this month and follow-up exam with breast surgery      3.  Osteopenia:  Given the risk of further bone loss with her aromatase inhibitor, we initiated Prolia 3/18/16. .  We will continue Prolia every 6 months while on aromatase inhibitor therapy.  She will have a BMP, mag, and phos checked prior to each injection.  Her DEXA scan reviewed today shows stable osteopenia with a T score -2.0 in the left hip.  This is unchanged from her DEXA 2 years ago.    4.  chronic mild hypercalcemia:  this has been evaluated by her PCP.  Protein electrophoresis, immunofixation, light chain ratio reviewed today negative for monoclonality                11/2/2018      CC:

## 2018-11-21 ENCOUNTER — APPOINTMENT (OUTPATIENT)
Dept: WOMENS IMAGING | Facility: HOSPITAL | Age: 73
End: 2018-11-21

## 2018-11-21 ENCOUNTER — HOSPITAL ENCOUNTER (OUTPATIENT)
Dept: MRI IMAGING | Facility: HOSPITAL | Age: 73
Discharge: HOME | End: 2018-11-21
Attending: SURGERY | Admitting: SURGERY

## 2018-11-21 DIAGNOSIS — N64.89 RADIAL SCAR OF BREAST: ICD-10-CM

## 2018-11-21 DIAGNOSIS — R92.8 ABNORMAL MRI, BREAST: ICD-10-CM

## 2018-11-21 DIAGNOSIS — D05.02 LOBULAR CARCINOMA IN SITU (LCIS) OF LEFT BREAST: ICD-10-CM

## 2018-11-21 DIAGNOSIS — D05.11 DUCTAL CARCINOMA IN SITU (DCIS) OF RIGHT BREAST: ICD-10-CM

## 2018-11-21 PROCEDURE — 82565 ASSAY OF CREATININE: CPT

## 2018-11-21 PROCEDURE — 77063 BREAST TOMOSYNTHESIS BI: CPT | Performed by: RADIOLOGY

## 2018-11-21 PROCEDURE — 77067 SCR MAMMO BI INCL CAD: CPT | Performed by: RADIOLOGY

## 2018-11-21 PROCEDURE — A9577 INJ MULTIHANCE: HCPCS | Performed by: SURGERY

## 2018-11-21 PROCEDURE — C8908 MRI W/O FOL W/CONT, BREAST,: HCPCS

## 2018-11-21 PROCEDURE — 0 GADOBENATE DIMEGLUMINE 529 MG/ML SOLUTION: Performed by: SURGERY

## 2018-11-21 PROCEDURE — 0159T HC MRI BREAST COMPUTER ANALYSIS: CPT

## 2018-11-21 RX ADMIN — GADOBENATE DIMEGLUMINE 12 ML: 529 INJECTION, SOLUTION INTRAVENOUS at 15:11

## 2018-11-23 LAB — CREAT BLDA-MCNC: 0.9 MG/DL (ref 0.6–1.3)

## 2018-11-26 ENCOUNTER — TELEPHONE (OUTPATIENT)
Dept: SURGERY | Facility: CLINIC | Age: 73
End: 2018-11-26

## 2018-11-26 NOTE — TELEPHONE ENCOUNTER
In March [facility] bilateral breast MRI November 21, 2018: No findings suspicious for malignancy in either breast BI-RADS 2.

## 2018-11-28 ENCOUNTER — TELEPHONE (OUTPATIENT)
Dept: SURGERY | Facility: CLINIC | Age: 73
End: 2018-11-28

## 2018-11-28 NOTE — TELEPHONE ENCOUNTER
Number [facility]st [facility] with [facility]-D.  The breasts are heterogenous Emerson dense.  Stable postsurgical scar in both breast.  BI-RADS 2.

## 2018-12-11 ENCOUNTER — OFFICE VISIT (OUTPATIENT)
Dept: SURGERY | Facility: CLINIC | Age: 73
End: 2018-12-11

## 2018-12-11 VITALS
OXYGEN SATURATION: 98 % | SYSTOLIC BLOOD PRESSURE: 138 MMHG | HEIGHT: 63 IN | BODY MASS INDEX: 24.1 KG/M2 | HEART RATE: 71 BPM | WEIGHT: 136 LBS | DIASTOLIC BLOOD PRESSURE: 78 MMHG

## 2018-12-11 DIAGNOSIS — D05.02 LOBULAR CARCINOMA IN SITU OF LEFT BREAST: ICD-10-CM

## 2018-12-11 DIAGNOSIS — R92.8 ABNORMAL MRI, BREAST: ICD-10-CM

## 2018-12-11 DIAGNOSIS — Z12.31 ENCOUNTER FOR SCREENING MAMMOGRAM FOR MALIGNANT NEOPLASM OF BREAST: ICD-10-CM

## 2018-12-11 DIAGNOSIS — N64.89 RADIAL SCAR OF BREAST: ICD-10-CM

## 2018-12-11 DIAGNOSIS — D05.11 DUCTAL CARCINOMA IN SITU (DCIS) OF RIGHT BREAST: Primary | ICD-10-CM

## 2018-12-11 PROCEDURE — 99213 OFFICE O/P EST LOW 20 MIN: CPT | Performed by: SURGERY

## 2019-01-03 RX ORDER — EXEMESTANE 25 MG/1
TABLET ORAL
Qty: 30 TABLET | Refills: 5 | Status: SHIPPED | OUTPATIENT
Start: 2019-01-03 | End: 2019-07-05 | Stop reason: SDUPTHER

## 2019-04-05 ENCOUNTER — INFUSION (OUTPATIENT)
Dept: ONCOLOGY | Facility: HOSPITAL | Age: 74
End: 2019-04-05

## 2019-04-05 ENCOUNTER — LAB (OUTPATIENT)
Dept: LAB | Facility: HOSPITAL | Age: 74
End: 2019-04-05

## 2019-04-05 ENCOUNTER — OFFICE VISIT (OUTPATIENT)
Dept: ONCOLOGY | Facility: CLINIC | Age: 74
End: 2019-04-05

## 2019-04-05 VITALS
OXYGEN SATURATION: 96 % | SYSTOLIC BLOOD PRESSURE: 131 MMHG | DIASTOLIC BLOOD PRESSURE: 75 MMHG | BODY MASS INDEX: 24.77 KG/M2 | HEART RATE: 75 BPM | HEIGHT: 63 IN | WEIGHT: 139.8 LBS | TEMPERATURE: 97.7 F | RESPIRATION RATE: 16 BRPM

## 2019-04-05 DIAGNOSIS — D05.02 LOBULAR CARCINOMA IN SITU OF LEFT BREAST: ICD-10-CM

## 2019-04-05 DIAGNOSIS — M85.80 OSTEOPENIA, UNSPECIFIED LOCATION: ICD-10-CM

## 2019-04-05 DIAGNOSIS — D05.11 DUCTAL CARCINOMA IN SITU (DCIS) OF RIGHT BREAST: Primary | ICD-10-CM

## 2019-04-05 DIAGNOSIS — M85.80 OSTEOPENIA, UNSPECIFIED LOCATION: Primary | ICD-10-CM

## 2019-04-05 LAB
ALBUMIN SERPL-MCNC: 4.3 G/DL (ref 3.5–5.2)
ALBUMIN/GLOB SERPL: 1.7 G/DL (ref 1.1–2.4)
ALP SERPL-CCNC: 47 U/L (ref 38–116)
ALT SERPL W P-5'-P-CCNC: 22 U/L (ref 0–33)
ANION GAP SERPL CALCULATED.3IONS-SCNC: 8 MMOL/L
AST SERPL-CCNC: 24 U/L (ref 0–32)
BASOPHILS # BLD AUTO: 0.05 10*3/MM3 (ref 0–0.2)
BASOPHILS NFR BLD AUTO: 1.1 % (ref 0–1.5)
BILIRUB SERPL-MCNC: 0.3 MG/DL (ref 0.2–1.2)
BUN BLD-MCNC: 22 MG/DL (ref 6–20)
BUN/CREAT SERPL: 28.2 (ref 7.3–30)
CALCIUM SPEC-SCNC: 10.4 MG/DL (ref 8.5–10.2)
CHLORIDE SERPL-SCNC: 106 MMOL/L (ref 98–107)
CO2 SERPL-SCNC: 29 MMOL/L (ref 22–29)
CREAT BLD-MCNC: 0.78 MG/DL (ref 0.6–1.1)
DEPRECATED RDW RBC AUTO: 41.1 FL (ref 37–54)
EOSINOPHIL # BLD AUTO: 0.37 10*3/MM3 (ref 0–0.4)
EOSINOPHIL NFR BLD AUTO: 7.9 % (ref 0.3–6.2)
ERYTHROCYTE [DISTWIDTH] IN BLOOD BY AUTOMATED COUNT: 12.4 % (ref 12.3–15.4)
GFR SERPL CREATININE-BSD FRML MDRD: 73 ML/MIN/1.73
GLOBULIN UR ELPH-MCNC: 2.5 GM/DL (ref 1.8–3.5)
GLUCOSE BLD-MCNC: 70 MG/DL (ref 74–124)
HCT VFR BLD AUTO: 42.8 % (ref 34–46.6)
HGB BLD-MCNC: 14.4 G/DL (ref 12–15.9)
IMM GRANULOCYTES # BLD AUTO: 0.02 10*3/MM3 (ref 0–0.05)
IMM GRANULOCYTES NFR BLD AUTO: 0.4 % (ref 0–0.5)
LYMPHOCYTES # BLD AUTO: 0.92 10*3/MM3 (ref 0.7–3.1)
LYMPHOCYTES NFR BLD AUTO: 19.6 % (ref 19.6–45.3)
MAGNESIUM SERPL-MCNC: 2 MG/DL (ref 1.8–2.5)
MCH RBC QN AUTO: 30.4 PG (ref 26.6–33)
MCHC RBC AUTO-ENTMCNC: 33.6 G/DL (ref 31.5–35.7)
MCV RBC AUTO: 90.3 FL (ref 79–97)
MONOCYTES # BLD AUTO: 0.41 10*3/MM3 (ref 0.1–0.9)
MONOCYTES NFR BLD AUTO: 8.7 % (ref 5–12)
NEUTROPHILS # BLD AUTO: 2.93 10*3/MM3 (ref 1.4–7)
NEUTROPHILS NFR BLD AUTO: 62.3 % (ref 42.7–76)
NRBC BLD AUTO-RTO: 0 /100 WBC (ref 0–0)
PHOSPHATE SERPL-MCNC: 2.6 MG/DL (ref 2.5–4.5)
PLATELET # BLD AUTO: 181 10*3/MM3 (ref 140–450)
PMV BLD AUTO: 9.8 FL (ref 6–12)
POTASSIUM BLD-SCNC: 4.4 MMOL/L (ref 3.5–4.7)
PROT SERPL-MCNC: 6.8 G/DL (ref 6.3–8)
RBC # BLD AUTO: 4.74 10*6/MM3 (ref 3.77–5.28)
SODIUM BLD-SCNC: 143 MMOL/L (ref 134–145)
WBC NRBC COR # BLD: 4.7 10*3/MM3 (ref 3.4–10.8)

## 2019-04-05 PROCEDURE — 85025 COMPLETE CBC W/AUTO DIFF WBC: CPT | Performed by: INTERNAL MEDICINE

## 2019-04-05 PROCEDURE — 96372 THER/PROPH/DIAG INJ SC/IM: CPT | Performed by: INTERNAL MEDICINE

## 2019-04-05 PROCEDURE — 36415 COLL VENOUS BLD VENIPUNCTURE: CPT | Performed by: INTERNAL MEDICINE

## 2019-04-05 PROCEDURE — 84100 ASSAY OF PHOSPHORUS: CPT | Performed by: INTERNAL MEDICINE

## 2019-04-05 PROCEDURE — 99213 OFFICE O/P EST LOW 20 MIN: CPT | Performed by: INTERNAL MEDICINE

## 2019-04-05 PROCEDURE — 83735 ASSAY OF MAGNESIUM: CPT | Performed by: INTERNAL MEDICINE

## 2019-04-05 PROCEDURE — 80053 COMPREHEN METABOLIC PANEL: CPT | Performed by: INTERNAL MEDICINE

## 2019-04-05 PROCEDURE — 25010000002 DENOSUMAB 60 MG/ML SOLUTION: Performed by: INTERNAL MEDICINE

## 2019-04-05 RX ORDER — VALACYCLOVIR HYDROCHLORIDE 1 G/1
TABLET, FILM COATED ORAL
Refills: 3 | COMMUNITY
Start: 2019-02-28 | End: 2022-03-10 | Stop reason: SDUPTHER

## 2019-04-05 RX ORDER — HEPATITIS A VACCINE 1440 [IU]/ML
INJECTION, SUSPENSION INTRAMUSCULAR
Refills: 0 | COMMUNITY
Start: 2019-01-15 | End: 2022-03-10

## 2019-04-05 RX ADMIN — DENOSUMAB 60 MG: 60 INJECTION SUBCUTANEOUS at 10:58

## 2019-04-05 NOTE — PROGRESS NOTES
REASONS FOR FOLLOW-UP:   1.  History of atypical lobular hyperplasia and LCIS of the breast diagnosed        2/2015   2.  Ductal carcinoma in situ, high-grade with necrosis, of the right breast        S/p lumpectomy 1/20/16 and  RT   3.  Chemoprevention with exemestane initiated February 2016   4.  Osteopenia (L hip T-score -2.1) on Prolia Q6 months    HISTORY OF PRESENT ILLNESS:     History of Present Illness  Mrs. Riggs returns today for follow-up of her above history.  She initiated chemoprevention with exemestane Feb 2016.  She receives Prolia 60 mg every 6 months for AI induced osteopenia.    Today doing well.  She tolerates exemestane without significant vasomotor symptoms.  She denies changes to the self breast exam and recent follow-up with breast surgery with imaging and exam all in good order.      Oncology/Hematology History    1. 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   2.  ductal carcinom a in situ, high grade with focal necrosis of the right breast status post lumpectomy on 01/20/2016 with negative margins; DCIS is ER/UT negative; patient received post-lumpectomy radiation therapy  3. Chemoprevention initiated with Exemestane February 2016        Ductal carcinoma in situ (DCIS) of right breast    3/16/2016 Initial Diagnosis     Ductal carcinoma in situ (DCIS) of right breast            Past Medical History, Past Surgical History, Social History, Family History have been reviewed and are without significant changes except as mentioned.    Review of Systems   Constitutional: Negative.    Respiratory: Negative.    Cardiovascular: Negative.    Gastrointestinal: Negative.    Genitourinary: Negative.    Musculoskeletal: Positive for arthralgias.   Skin: Negative.    Neurological: Negative.    Hematological: Negative.       ROS  "unchanged- 4/5/2019    Medications:  The current medication list was reviewed in the EMR    ALLERGIES:  No Known Allergies    Objective      Vitals:    04/05/19 1027   BP: 131/75   Pulse: 75   Resp: 16   Temp: 97.7 °F (36.5 °C)   TempSrc: Oral   SpO2: 96%   Weight: 63.4 kg (139 lb 12.8 oz)   Height: 161 cm (63.39\")   PainSc: 0-No pain     Current Status 4/5/2019   ECOG score 0       Physical Exam   Constitutional: She is oriented to person, place, and time. She appears well-developed and well-nourished.   HENT:   Head: Normocephalic and atraumatic.   Cardiovascular: Normal rate and regular rhythm.   Pulmonary/Chest: Effort normal. No respiratory distress. She has no wheezes.   Abdominal: She exhibits no distension.   Neurological: She is alert and oriented to person, place, and time.   Skin: Skin is warm and dry.   Psychiatric: She has a normal mood and affect.      Exam unchanged- 4/5/2019    RECENT LABS:  Hematology WBC   Date Value Ref Range Status   04/05/2019 4.70 3.40 - 10.80 10*3/mm3 Final     RBC   Date Value Ref Range Status   04/05/2019 4.74 3.77 - 5.28 10*6/mm3 Final     Hemoglobin   Date Value Ref Range Status   04/05/2019 14.4 12.0 - 15.9 g/dL Final     Hematocrit   Date Value Ref Range Status   04/05/2019 42.8 34.0 - 46.6 % Final     Platelets   Date Value Ref Range Status   04/05/2019 181 140 - 450 10*3/mm3 Final      DEXA:   osteopenia T-score -2.0 left hip on 3/22/18    Assessment/Plan       1.  High risk breast lesions including previous lobular hyperplasia and lobular carcinoma in situ of the left breast, followed by ductal carcinoma in situ high-grade of the right breast status post lumpectomy and  radiation therapy.  Given her high risk findings for increased risk of invasive breast cancer, the patient is undergoing chemoprevention with exemestane initiated February 2016 anticipating 5 years of chemoprevention.  She continues routine breast surveillance and exams with Dr. Pierce.      3.  " Osteopenia:  Given the risk of further bone loss with her aromatase inhibitor, we initiated Prolia 3/18/16. .  We will continue Prolia every 6 months while on aromatase inhibitor therapy.  She will have a BMP, mag, and phos checked prior to each injection.  Her DEXA scan 3/22/18 showed stable osteopenia with a T score -2.0 in the left hip.  2 year f/u DEXA planned.    4.  chronic mild hypercalcemia:  this has been evaluated by her PCP.                  4/5/2019      CC:

## 2019-06-10 ENCOUNTER — TELEPHONE (OUTPATIENT)
Dept: SURGERY | Facility: CLINIC | Age: 74
End: 2019-06-10

## 2019-06-10 NOTE — TELEPHONE ENCOUNTER
11/22/2019 MAMMO [facility] @ 9:00AM, MRI TO FOLLOW  [facility] @ 10:45AM; F/U W/ [facility] 12/02/2019 @ 12:30PM. PT CONFIRMED.      KL

## 2019-07-08 RX ORDER — EXEMESTANE 25 MG/1
TABLET ORAL
Qty: 30 TABLET | Refills: 5 | Status: SHIPPED | OUTPATIENT
Start: 2019-07-08 | End: 2020-01-10

## 2019-10-04 ENCOUNTER — APPOINTMENT (OUTPATIENT)
Dept: LAB | Facility: HOSPITAL | Age: 74
End: 2019-10-04

## 2019-10-04 ENCOUNTER — OFFICE VISIT (OUTPATIENT)
Dept: ONCOLOGY | Facility: CLINIC | Age: 74
End: 2019-10-04

## 2019-10-04 ENCOUNTER — APPOINTMENT (OUTPATIENT)
Dept: ONCOLOGY | Facility: HOSPITAL | Age: 74
End: 2019-10-04

## 2019-10-04 VITALS
SYSTOLIC BLOOD PRESSURE: 115 MMHG | TEMPERATURE: 97.6 F | RESPIRATION RATE: 12 BRPM | HEART RATE: 64 BPM | WEIGHT: 139.7 LBS | BODY MASS INDEX: 24.75 KG/M2 | DIASTOLIC BLOOD PRESSURE: 74 MMHG | OXYGEN SATURATION: 98 % | HEIGHT: 63 IN

## 2019-10-04 DIAGNOSIS — D05.91 CARCINOMA IN SITU OF RIGHT BREAST, UNSPECIFIED TYPE: Primary | ICD-10-CM

## 2019-10-04 DIAGNOSIS — Z79.811 AROMATASE INHIBITOR USE: ICD-10-CM

## 2019-10-04 DIAGNOSIS — D05.02 LOBULAR CARCINOMA IN SITU OF LEFT BREAST: ICD-10-CM

## 2019-10-04 DIAGNOSIS — M85.80 OSTEOPENIA, UNSPECIFIED LOCATION: ICD-10-CM

## 2019-10-04 PROCEDURE — 99213 OFFICE O/P EST LOW 20 MIN: CPT | Performed by: INTERNAL MEDICINE

## 2019-10-04 PROCEDURE — G0463 HOSPITAL OUTPT CLINIC VISIT: HCPCS | Performed by: INTERNAL MEDICINE

## 2019-10-04 NOTE — PROGRESS NOTES
REASONS FOR FOLLOW-UP:   1.  History of atypical lobular hyperplasia and LCIS of the breast diagnosed        2/2015   2.  Ductal carcinoma in situ, high-grade with necrosis, of the right breast        S/p lumpectomy 1/20/16 and  RT   3.  Chemoprevention with exemestane initiated February 2016   4.  Osteopenia (L hip T-score -2.1) on Prolia Q6 months    HISTORY OF PRESENT ILLNESS:     History of Present Illness  Mrs. Riggs returns today for follow-up of her above history.  She initiated chemoprevention with exemestane Feb 2016.  She receives Prolia 60 mg every 6 months for AI induced osteopenia.    Today doing well.  She tolerates exemestane without significant vasomotor symptoms.  She denies changes to the self breast exam.  She denies unusual pain, cough, shortness of breath or other concerns.      Oncology/Hematology History    1. 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   2.  ductal carcinom a in situ, high grade with focal necrosis of the right breast status post lumpectomy on 01/20/2016 with negative margins; DCIS is ER/TX negative; patient received post-lumpectomy radiation therapy  3. Chemoprevention initiated with Exemestane February 2016        Ductal carcinoma in situ (DCIS) of right breast    3/16/2016 Initial Diagnosis     Ductal carcinoma in situ (DCIS) of right breast            Past Medical History, Past Surgical History, Social History, Family History have been reviewed and are without significant changes except as mentioned.    Review of Systems   Constitutional: Negative.    Respiratory: Negative.    Cardiovascular: Negative.    Gastrointestinal: Negative.    Genitourinary: Negative.    Musculoskeletal: Positive for arthralgias.   Skin: Negative.    Neurological: Negative.    Hematological: Negative.       ROS  "unchanged- 10/4/2019    Medications:  The current medication list was reviewed in the EMR    ALLERGIES:  No Known Allergies    Objective      Vitals:    10/04/19 1023   BP: 115/74   Pulse: 64   Resp: 12   Temp: 97.6 °F (36.4 °C)   TempSrc: Oral   SpO2: 98%   Weight: 63.4 kg (139 lb 11.2 oz)   Height: 158.8 cm (62.52\")   PainSc: 0-No pain     Current Status 10/4/2019   ECOG score 0       Physical Exam   Constitutional: She is oriented to person, place, and time. She appears well-developed and well-nourished.   HENT:   Head: Normocephalic and atraumatic.   Cardiovascular: Normal rate and regular rhythm.   Pulmonary/Chest: Effort normal. No respiratory distress. She has no wheezes.   Abdominal: She exhibits no distension.   Neurological: She is alert and oriented to person, place, and time.   Skin: Skin is warm and dry.   Psychiatric: She has a normal mood and affect.      Exam unchanged- 10/4/2019    RECENT LABS:  Hematology WBC   Date Value Ref Range Status   04/05/2019 4.70 3.40 - 10.80 10*3/mm3 Final     RBC   Date Value Ref Range Status   04/05/2019 4.74 3.77 - 5.28 10*6/mm3 Final     Hemoglobin   Date Value Ref Range Status   04/05/2019 14.4 12.0 - 15.9 g/dL Final     Hematocrit   Date Value Ref Range Status   04/05/2019 42.8 34.0 - 46.6 % Final     Platelets   Date Value Ref Range Status   04/05/2019 181 140 - 450 10*3/mm3 Final      DEXA:   osteopenia T-score -2.0 left hip on 3/22/18    Assessment/Plan       1.  High risk breast lesions including previous lobular hyperplasia and lobular carcinoma in situ of the left breast, followed by ductal carcinoma in situ high-grade of the right breast status post lumpectomy and  radiation therapy.  Given her high risk findings for increased risk of invasive breast cancer, the patient is undergoing chemoprevention with exemestane initiated February 2016 anticipating 5 years of chemoprevention.  She continues routine breast surveillance and exams with Dr. Pierce.      3.  " Osteopenia:  Given the risk of further bone loss with her aromatase inhibitor, we initiated Prolia 3/18/16. .  We will continue Prolia every 6 months while on aromatase inhibitor therapy.  She will have a BMP, mag, and phos checked prior to each injection.  Her DEXA scan 3/22/18 showed stable osteopenia with a T score -2.0 in the left hip.  I ordered a repeat DEXA scan prior to her six-month follow-up.    4.  chronic mild hypercalcemia:  this has been evaluated by her PCP.                  10/4/2019      CC:

## 2019-10-07 ENCOUNTER — APPOINTMENT (OUTPATIENT)
Dept: LAB | Facility: HOSPITAL | Age: 74
End: 2019-10-07

## 2019-10-07 ENCOUNTER — APPOINTMENT (OUTPATIENT)
Dept: ONCOLOGY | Facility: HOSPITAL | Age: 74
End: 2019-10-07

## 2019-10-10 ENCOUNTER — LAB (OUTPATIENT)
Dept: LAB | Facility: HOSPITAL | Age: 74
End: 2019-10-10

## 2019-10-10 ENCOUNTER — INFUSION (OUTPATIENT)
Dept: ONCOLOGY | Facility: HOSPITAL | Age: 74
End: 2019-10-10

## 2019-10-10 DIAGNOSIS — M85.80 OSTEOPENIA, UNSPECIFIED LOCATION: ICD-10-CM

## 2019-10-10 DIAGNOSIS — M85.80 OSTEOPENIA, UNSPECIFIED LOCATION: Primary | ICD-10-CM

## 2019-10-10 LAB
ALBUMIN SERPL-MCNC: 4.1 G/DL (ref 3.5–5.2)
ALBUMIN/GLOB SERPL: 1.6 G/DL (ref 1.1–2.4)
ALP SERPL-CCNC: 52 U/L (ref 38–116)
ALT SERPL W P-5'-P-CCNC: 17 U/L (ref 0–33)
ANION GAP SERPL CALCULATED.3IONS-SCNC: 9 MMOL/L (ref 5–15)
AST SERPL-CCNC: 22 U/L (ref 0–32)
BASOPHILS # BLD AUTO: 0.03 10*3/MM3 (ref 0–0.2)
BASOPHILS NFR BLD AUTO: 0.6 % (ref 0–1.5)
BILIRUB SERPL-MCNC: 0.3 MG/DL (ref 0.2–1.2)
BUN BLD-MCNC: 25 MG/DL (ref 6–20)
BUN/CREAT SERPL: 20.7 (ref 7.3–30)
CALCIUM SPEC-SCNC: 10.6 MG/DL (ref 8.5–10.2)
CHLORIDE SERPL-SCNC: 104 MMOL/L (ref 98–107)
CO2 SERPL-SCNC: 29 MMOL/L (ref 22–29)
CREAT BLD-MCNC: 1.21 MG/DL (ref 0.6–1.1)
DEPRECATED RDW RBC AUTO: 45.2 FL (ref 37–54)
EOSINOPHIL # BLD AUTO: 0.28 10*3/MM3 (ref 0–0.4)
EOSINOPHIL NFR BLD AUTO: 5.5 % (ref 0.3–6.2)
ERYTHROCYTE [DISTWIDTH] IN BLOOD BY AUTOMATED COUNT: 13 % (ref 12.3–15.4)
GFR SERPL CREATININE-BSD FRML MDRD: 44 ML/MIN/1.73
GLOBULIN UR ELPH-MCNC: 2.5 GM/DL (ref 1.8–3.5)
GLUCOSE BLD-MCNC: 84 MG/DL (ref 74–124)
HCT VFR BLD AUTO: 39.9 % (ref 34–46.6)
HGB BLD-MCNC: 13.1 G/DL (ref 12–15.9)
IMM GRANULOCYTES # BLD AUTO: 0.02 10*3/MM3 (ref 0–0.05)
IMM GRANULOCYTES NFR BLD AUTO: 0.4 % (ref 0–0.5)
LYMPHOCYTES # BLD AUTO: 0.92 10*3/MM3 (ref 0.7–3.1)
LYMPHOCYTES NFR BLD AUTO: 18 % (ref 19.6–45.3)
MAGNESIUM SERPL-MCNC: 2 MG/DL (ref 1.8–2.5)
MCH RBC QN AUTO: 31.2 PG (ref 26.6–33)
MCHC RBC AUTO-ENTMCNC: 32.8 G/DL (ref 31.5–35.7)
MCV RBC AUTO: 95 FL (ref 79–97)
MONOCYTES # BLD AUTO: 0.44 10*3/MM3 (ref 0.1–0.9)
MONOCYTES NFR BLD AUTO: 8.6 % (ref 5–12)
NEUTROPHILS # BLD AUTO: 3.43 10*3/MM3 (ref 1.7–7)
NEUTROPHILS NFR BLD AUTO: 66.9 % (ref 42.7–76)
NRBC BLD AUTO-RTO: 0 /100 WBC (ref 0–0.2)
PHOSPHATE SERPL-MCNC: 2.7 MG/DL (ref 2.5–4.5)
PLATELET # BLD AUTO: 185 10*3/MM3 (ref 140–450)
PMV BLD AUTO: 9.6 FL (ref 6–12)
POTASSIUM BLD-SCNC: 4.7 MMOL/L (ref 3.5–4.7)
PROT SERPL-MCNC: 6.6 G/DL (ref 6.3–8)
RBC # BLD AUTO: 4.2 10*6/MM3 (ref 3.77–5.28)
SODIUM BLD-SCNC: 142 MMOL/L (ref 134–145)
WBC NRBC COR # BLD: 5.12 10*3/MM3 (ref 3.4–10.8)

## 2019-10-10 PROCEDURE — 25010000002 DENOSUMAB 60 MG/ML SOLUTION PREFILLED SYRINGE: Performed by: INTERNAL MEDICINE

## 2019-10-10 PROCEDURE — 80053 COMPREHEN METABOLIC PANEL: CPT

## 2019-10-10 PROCEDURE — 83735 ASSAY OF MAGNESIUM: CPT

## 2019-10-10 PROCEDURE — 85025 COMPLETE CBC W/AUTO DIFF WBC: CPT

## 2019-10-10 PROCEDURE — 96372 THER/PROPH/DIAG INJ SC/IM: CPT | Performed by: INTERNAL MEDICINE

## 2019-10-10 PROCEDURE — 36415 COLL VENOUS BLD VENIPUNCTURE: CPT

## 2019-10-10 PROCEDURE — 84100 ASSAY OF PHOSPHORUS: CPT

## 2019-10-10 RX ADMIN — DENOSUMAB 60 MG: 60 INJECTION SUBCUTANEOUS at 14:49

## 2019-10-10 NOTE — PROGRESS NOTES
Ca++ level today is 10.6. This is baseline for this patient.  Per Dr Munguia's note, her PCP is following this and patient states that she will be seeing him in a few weeks.

## 2019-11-22 ENCOUNTER — HOSPITAL ENCOUNTER (OUTPATIENT)
Dept: MRI IMAGING | Facility: HOSPITAL | Age: 74
Discharge: HOME | End: 2019-11-22
Admitting: SURGERY

## 2019-11-22 ENCOUNTER — APPOINTMENT (OUTPATIENT)
Dept: WOMENS IMAGING | Facility: HOSPITAL | Age: 74
End: 2019-11-22

## 2019-11-22 DIAGNOSIS — D05.11 DUCTAL CARCINOMA IN SITU (DCIS) OF RIGHT BREAST: ICD-10-CM

## 2019-11-22 DIAGNOSIS — D05.02 LOBULAR CARCINOMA IN SITU OF LEFT BREAST: ICD-10-CM

## 2019-11-22 PROCEDURE — C8937 CAD BREAST MRI: HCPCS

## 2019-11-22 PROCEDURE — 82565 ASSAY OF CREATININE: CPT

## 2019-11-22 PROCEDURE — A9577 INJ MULTIHANCE: HCPCS | Performed by: SURGERY

## 2019-11-22 PROCEDURE — 77063 BREAST TOMOSYNTHESIS BI: CPT | Performed by: RADIOLOGY

## 2019-11-22 PROCEDURE — C8908 MRI W/O FOL W/CONT, BREAST,: HCPCS

## 2019-11-22 PROCEDURE — 77067 SCR MAMMO BI INCL CAD: CPT | Performed by: RADIOLOGY

## 2019-11-22 PROCEDURE — 0 GADOBENATE DIMEGLUMINE 529 MG/ML SOLUTION: Performed by: SURGERY

## 2019-11-22 RX ADMIN — GADOBENATE DIMEGLUMINE 12 ML: 529 INJECTION, SOLUTION INTRAVENOUS at 11:27

## 2019-11-25 ENCOUNTER — TELEPHONE (OUTPATIENT)
Dept: SURGERY | Facility: CLINIC | Age: 74
End: 2019-11-25

## 2019-11-25 LAB — CREAT BLDA-MCNC: 0.7 MG/DL (ref 0.6–1.3)

## 2019-11-25 NOTE — TELEPHONE ENCOUNTER
Bilateral breast MRI November 22, 2019 [facility].  No areas of abnormal enhancement in either breast BI-RADS 2.  [facility]  Postsurgical scar on the right is benign.  Postsurgical scar upper outer left and upper inner left are benign.  BI-RADS 2.

## 2019-12-02 ENCOUNTER — OFFICE VISIT (OUTPATIENT)
Dept: SURGERY | Facility: CLINIC | Age: 74
End: 2019-12-02

## 2019-12-02 VITALS
BODY MASS INDEX: 23.39 KG/M2 | HEIGHT: 64 IN | OXYGEN SATURATION: 97 % | HEART RATE: 80 BPM | SYSTOLIC BLOOD PRESSURE: 132 MMHG | WEIGHT: 137 LBS | DIASTOLIC BLOOD PRESSURE: 77 MMHG

## 2019-12-02 DIAGNOSIS — N64.89 RADIAL SCAR OF BREAST: ICD-10-CM

## 2019-12-02 DIAGNOSIS — R92.8 ABNORMAL MRI, BREAST: ICD-10-CM

## 2019-12-02 DIAGNOSIS — D05.02 LOBULAR CARCINOMA IN SITU OF LEFT BREAST: ICD-10-CM

## 2019-12-02 DIAGNOSIS — D05.11 DUCTAL CARCINOMA IN SITU (DCIS) OF RIGHT BREAST: Primary | ICD-10-CM

## 2019-12-02 PROCEDURE — 99212 OFFICE O/P EST SF 10 MIN: CPT | Performed by: SURGERY

## 2019-12-02 RX ORDER — GABAPENTIN 100 MG/1
CAPSULE ORAL
Refills: 0 | COMMUNITY
Start: 2019-11-08 | End: 2022-03-10

## 2019-12-02 NOTE — PROGRESS NOTES
Chief Complaint: Reyna Riggs is a 74 y.o. 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  She noted no masses, skin changes, nipple discharge, nipple changes prior to her most recent imaging.       Her most recent imaging includes:  08/19/09          [facility]        Yearly screening mammogram          Yulia B. Mudge  The breasts are heterogeneously dense.   BIRADS Category 1: Negative     08/27/10        [facility]         Yearly screening mammogram         Yulia B. Mudge   The breasts are heterogeneously dense.   BIRADS Category 1: Negative     09/08/11         [facility]         Yearly screening mammogram        Yulia B Mudge   The breasts are heterogeneously dense.   There is a stable area of architectural distortion with associated post-surgical scar seen in the upper outer region of the left breast. No significant change. Patient has reported has prior excisional left breast biopsy to account for this finding.   BIRADS Category 2: Benign     09/17/12        [facility]         Yearly screening mammogram        Yulia B Mudge   The breasts are heterogeneously dense.   Architectural distortion is in and area of prior excisional biopsy. No significant change.   BIRADS Category 2: Benign     09/19/13        [facility]          Yearly screening mammogram           [place]ulia B [place]   The breasts are heterogeneously dense.   Stable area of architectural distortion upper outer region of the left breast.   BIRADS Category 2: Benign     11/12/14          [facility]           BILATERAL SCREENING MAMMOGRAM WITH TOMOSYNTHESIS       Reyna Riggs   The breasts are heterogeneously dense.  "  Finding 1: irregular mass measuring 11 millimeters posterior one-third of the left breast at 9 o'clock, in the central region and in the medial region. This finding is only seen on tomosynthesis.   Finding 2: Area of architectural distortion seen in the lateral region of the left breast.   In the right breast, no masses, significant calcifications or other abnormalities are seen.   IMPRESSION:   Finding 1: Requires additional evaluation.   Finding 2: Requires additional evaluation.   BIRADS Category 0 Incomplete     12/02/14       [facility]           LEFT DIAGNOSTIC MAMMOGRAM WITH TOMOSYNTHESIS       Reyna Riggs   The breast is heterogeneously dense.   Finding 1: irregular mass left breast 8 millimeters posterior one-third 10:30 o'clock region of the left breast located 6 centimeters from the nipple.   Finding 2: Area of architectural distortion in the left breast does not persist.   LEFT BREAST ULTRASOUND:  Finding 1: irregular taller-than-wide hypoechoic area with indisict margins measuring 5 x 3 x 2 mm.   Finding 2: There is no evidence of any solid mass or abnormal cystic elements.   IMPRESSION:   Finding 1: Suspicious   Finding 2: Benign-Negative   BIRADS Category 4A: Suspicious Abnormality    She had a biopsy on the following day that showed:    12/11/14        [facility]           Left Ultrasound Guided Vacuum Assisted Biopsy                           Reyna OSCAR Keely  10:30 left breast  12 gauge  A total of 11 cores a(n) minicork shaped s-shereen tissue marker was placed at the biopsy site.   ADDENDUM 12-16-14:  Returned atypical lobular hyperplasia arising a radial scar.   Post clip mammogram demonstrates good position of the mini \"cork\" shaped clip at the 10:30 position of the left breast with no significant hematoma formation.     12/11/14        [facility]           Pathology Report            Reyna Riggs   Left Breast 10:30:  Atypical lobular hyperplasia arising in radial scar.       She has had  2 excisional breast " biopsies  in the past, LEFT breast, benign, age 42.  She has her uterus and ovaries, is postmenopausal, and takes no hormones.  Her family history includes the following: She has one sister, one brother, 2 daughters, 3 MA, 3 MU, 3 PU, 3 PA. No breast or ovarian cancer in her family.     We called Dr Melotn to clarify preoperative CXR mention of tiny hyperdense nodules in the RIGHT lung- he stated not worrisome, no additional imaging or FU needed.    We went to the operating room on 1-7-15 for lumpectomy for excision atypical hyperplasia.    1-7-15 excision ALH returned as LCIS, 1.6 cm, focally present at inferior margin.    In the interim,  Yulia  has done well.  She has noted no changes in her breast exam. No new masses, skin changes,  nipple changes, nipple discharge either breast.   She denies headache, bone pain, belly pain, cough, changes in vision or gait.  She met with Dr Munguia and they decided not to start chemoprevention.      Her most recent imaging includes the followin/15/15            [facility]            LEFT BREAST MAMMOGRAM WITH TOMOSYNTHESIS          [place] A Mudge     post biopsy follow-up.   the breast is heterogeneously dense.   Follow-up focal asymmetry in the left breast, 10:30 o'clock post surgical scar posterior one third medial left breast. In an area of prior excisional biopsy.   IMPRESSION:   Benign Negative  BI-RADS Category 2: Benign     In the interim,  Yulia  has done well.  She has noted no changes in her breast exam. No new masses, skin changes,  nipple changes, nipple discharge either breast.   She denies headache, bone pain, belly pain, cough, changes in vision or gait.    Her most recent imaging includes the followin-13-15       [facility]       Bilateral Screening Mammogram with Tomosynthesis      [place]udge, [place]  The breast are heterogenously dense,  stable post- surgical scar medial region of the left breast. in  an area of prior excisional  biopsy.  ImpressioN  benign-negative  Birads Category 2:  Benign    11-13-15        [facility]        Bilateral Breast MRi         Reyna Riggs  posterior one third upper inner left breast 10 o'clock there is mild postsurgical architectural distortion.  Within the posterior one third retroareolar region of the right breast 7.3  cm  posterior to   the nipple there is linear, branching enhancement that extends 2.2 cm anterior  to posterior,  0.4 and 1.2 cm in superior to inferior Mammographically, there are no calcifications seen within   this region.  Impression:  1.  Postsurgical site left breast with enhancement of a typically benign reactive character.   The imaging features do not have a suspicious feature but are somewhat indeterminate and the  area is likely best evaluated with a follow up breast MRI in 6 months to 12 months.  2.  Linear, branching enhancement seen in the posterior one third of   retroareolar region of the right breast.  Correlation with an MR guided right breast  biopsy is recommended.  Birads Category 4: Suspicious      I then arranged for a RIGHT breast MRI guided biopsy:    12/01/15                  [facility]              MRI GUIDED MAMMOTOME VACUUM ASSISTED RIGHT BREAST BIOPSY                 Reyna Riggs Yulia   8-gauge   Multiple tissue specimens  Post biopsy mammogram was obtained demonstrating adequate placement of a metallic clip in th eposterior 1/3 retroareolar region of the right breast.   The pathology result has returned as DCIS. This is concordant.     12/01/15                [facility]              RIGHT BREAST MRI BIOPSY            Pathology         Reyna Riggs   Diagnosis:   Right breast tissue, MRI directed biopsies:  Ductal carcinoma in situ, intermediate to high nuclear grade, without necrosis.   Hormone receptor studies pending   maximum span of 3mm.     12/01/15                       ER/NV                        Reyna Riggs  ER  0%  NV  0%      We went to the operating room on 1-20-16  for bracketted MRI guided needle loc lumpectomy- pathology returned as 8mm DCIS, focally high grade, focal necrosis, margins, clear-- closest is to superior margin- typographical error in report, called on this and was <1/2 mm from superior margin,  but additional superior margin clear.   Path stage TisN0- stage 0.    She reports some lightning pain in her nipple. Denies any redness, warmth, drainage.      In the interim,  Reyna Riggs  has done well.  She took whole breast plus boost radiation 6 weeks with Dr. Parr from February 29, 2016 through April 8, 2016.  She tolerated this well.    She started Aromasin) Marie up with Dr. Munguia February 2016.  She is noticed a 5 pound weight gain.  Otherwise she feels that she is tolerating a well.    She has noted no changes in her breast exam. No new masses, skin changes, nipple changes, nipple discharge either breast.   She denies headache, bone pain, belly pain, cough, changes in vision or gait.  Her most recent imaging includes the following:  [facility] 14, 2016 heterogenous a dense tissue.  Post surgical scar in both breast.  BI-RADS 2.  [facility] bilateral breast MRI November 16, 2016.  Peripheral enhancement focally prominent 4 mm posterior one third medial left breast.  No finding at the right breast directly site.  Recommend six-month left mammogram and 6 versus 12 month MRI.  BI-RADS 3.      In the interim,  Reyna Riggs  has done well.  She has noted no changes in her breast exam. No new masses, skin changes, nipple changes, nipple discharge either breast.   She denies headache, bone pain, belly pain, cough, changes in vision or gait.    She has had trouble with LEFT hand pain and some arthritis, so she stopped her aromasin in March.  She did not notice a great deal of improvement until she was started on meloxicam by Dr Pallares.    Her most recent imaging includes the  following:  [facility] June 1, 2017 bilateral breast MRI for follow-up abnormal MRI finding.  There is been resolution of all abnormal enhancement in the left breast since prior examination.  BI-RADS 2 benign.  [facility] [place] [facility] 2017 left diagnostic mammogram with 3-D.  The breast is heterogenous a dense.  Focal asymmetry posterior one third upper inner left breast at the site of her procedure likely calcified oil cyst or fat necrosis.  Follow up mammogram in 6 months is recommended BI-RADS 3.        In the interim,  Reyna Riggs  has done well.  She has noted no changes in her breast exam. No new masses, skin changes, nipple changes, nipple discharge either breast.   She denies headache, bone pain, belly pain, cough, changes in vision or gait.  Her most recent imaging includes the following:  [facility] November the 20th 2017 bilateral diagnostic mammogram with 3-D.  The breasts are heterogenous a dense.  Stable postsurgical scar upper outer right breast lumpectomy.  Stable postsurgical scar upper inner left breast.  Stable postsurgical scar lateral left breast.  BI-RADS 2.    She restarted the aromasin with meloxicam and is weaning off of the meloxicam.    Her weight is 5 pounds less thatn last visit.      In the interim,  Reyna Riggs  has done well.  She has noted no changes in her breast exam. No new masses, skin changes, nipple changes, nipple discharge either breast.   She denies headache, bone pain, belly pain, cough, changes in vision or gait.    Her most recent imaging includes the following:  [facility].  The breasts are heterogenously dense.  Stable postsurgical scar in both breast.  BI-RADS 2.     [facility] bilateral breast MRI November 21, 2018: No findings suspicious for malignancy in either breast BI-RADS 2.    Interval History:  In the interim,  Reyna Riggs  has  done well.  She has noted no changes in her breast exam. No new masses, skin changes, nipple changes, nipple discharge either breast.   She denies headache, bone pain, belly pain, cough, changes in vision or gait.    Her most recent imaging includes the following:  Bilateral breast MRI November 22, 2019 [facility].  No areas of abnormal enhancement in either breast BI-RADS 2.  [facility].  Postsurgical scar on the right is benign.  Postsurgical scar upper outer left and upper inner left are benign.  BI-RADS 2.    She continues on the Aromasin in the [place] under the supervision of Dr. Munguia.  She has recently lost her brother in September and is tearful about this today.  Her creatinine bumped slightly and she believes and her primary care physician believes this is related to some meloxicam which she has promptly stopped.  She is here to review.    Review of Systems:  Review of Systems   Constitutional: Positive for unexpected weight change (1 lb wt gain).   All other systems reviewed and are negative.       Past Medical and Surgical History:  Breast Biopsy History:  Patient has had the following breast biopsies:2 prior excisional biopsies LEFT breast around age 42. UOQ and lateral.  Breast Cancer HIstory:  Patient does not have a past medical history of breast cancer.  Breast Operations, and year:  None   Obstetric/Gynecologic History:  Age menstrual periods began:15  Patient is postmenopausal, entered menopause naturally at age: 52   Number of pregnancies:2  Number of live births: 2  Number of abortions or miscarriages: 0  Age of delivery of first child: 27  Patient did not breast feed.  Length of time taking birth control pills:n/a  Patient has never taken hormone replacement  The patient still has her uterus and ovaries.      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        Prior Hospitalizations, other than for surgery or childbirth, and year:  None     Social History     Socioeconomic History   • Marital status:      Spouse name: Juan David   • Number of children: 2   • Years of education: College   • Highest education level: Not on file   Occupational History     Employer: [employer]     Comment: Worked in Human Resources     Employer: RETIRED   Tobacco Use   • Smoking status: Former Smoker     Packs/day: 1.00     Years: 10.00     Pack years: 10.00   • Smokeless tobacco: Never Used   Substance and Sexual Activity   • Alcohol use: Yes     Comment: 1 GLASS OF ALCOHOL DAILY   • Drug use: No   Social History Narrative    She is very active. Enjoys golfing, walking, sewing, etc.     Patient is .  Patient is retired.  Patient drinks 1 servings of caffeine per day.    Family History:  Family History   Problem Relation Age of Onset   • Esophageal cancer Father 74   • Kidney cancer Brother 53   • Diabetes Other    • Diabetes Mother    • Diabetes Sister        Vital Signs:  /77 (BP Location: Left arm, Patient Position: Sitting, Cuff Size: Adult)   Pulse 80   Ht 162.6 cm (64\")   Wt 62.1 kg (137 lb)   LMP  (LMP Unknown)   SpO2 97%   Breastfeeding? No   BMI 23.52 kg/m²      Medications:    Current Outpatient Medications:   •  aspirin 81 MG tablet, Take 81 mg by mouth daily., Disp: , Rfl:   •  Cholecalciferol (VITAMIN D) 1000 UNITS tablet, Take 1,000 Units by mouth daily., Disp: , Rfl:   •  exemestane (AROMASIN) 25 MG chemo tablet, TAKE 1 TABLET BY MOUTH DAILY, Disp: 30 tablet, Rfl: 5  •  gabapentin (NEURONTIN) 100 MG capsule, " "TK 1 C PO QHS, Disp: , Rfl: 0  •  HAVRIX 1440 EL U/ML vaccine, ADM 1ML IM UTD, Disp: , Rfl: 0  •  Multiple Vitamin (MULTI-VITAMIN PO), Take  by mouth., Disp: , Rfl:   •  sertraline (ZOLOFT) 25 MG tablet, Take 25 mg by mouth daily., Disp: , Rfl:   •  valACYclovir (VALTREX) 1000 MG tablet, TK 2 TS  PO Q 12 H, Disp: , Rfl: 3     Allergies:  No Known Allergies    Physical Examination:  /77 (BP Location: Left arm, Patient Position: Sitting, Cuff Size: Adult)   Pulse 80   Ht 162.6 cm (64\")   Wt 62.1 kg (137 lb)   LMP  (LMP Unknown)   SpO2 97%   Breastfeeding? No   BMI 23.52 kg/m²   General Appearance:  Patient is in no distress.  She is well kept and has an average  build.   Psychiatric:  Patient with appropriate mood and affect. Alert and oriented to self, time, and place.    Breast, RIGHT: large  sized, asymmetric just slightly larger than the LEFT breast.   Breast skin is without erythema, edema, rashes.  There are no visible abnormalities upon inspection during the arm-raising maneuver or with hands on hips in the sitting position. There is no nipple retraction, discharge or nipple/areolar skin changes.There are no masses palpable in the sitting or supine positions. There is a well healed RIGHT lateral radial incision  from 1-2016 lumpectomy for DCIS.    Breast, LEFT:  large  sized, asymmetric just slightly smaller than the RIGHT breast .  Breast skin is without erythema, edema, rashes.  There are no visible abnormalities upon inspection during the arm-raising maneuver or with hands on hips in the sitting position. There is no nipple retraction, discharge or nipple/areolar skin changes.There are no masses palpable in the sitting or supine positions. There is a well healed UIQ curvilinear incision from her lumpectomy for ALH/upgrade to LCIS. There is a slight tissue void at the site of lumpectomy.    Lymphatic:  There is no axillary, cervical, infraclavicular, or supraclavicular adenopathy " bilaterally.  Eyes:  Pupils are round and reactive to light.  Cardiovascular:  Heart rate and rhythm are regular.  Respiratory:  Lungs are clear bilaterally with no crackles or wheezes in any lung field.  Gastrointestinal:  Abdomen is soft, nondistended, and nontender.  Musculoskeletal:  Good strength in all 4 extremities.  There is good range of motion in both shoulders.   Skin:  No new skin lesions or rashes on the skin excluding the breast (see breast exam above).        Imagin/12/14          [facility]           BILATERAL SCREENING MAMMOGRAM WITH TOMOSYNTHESIS       [place] A Mudge   The breasts are heterogeneously dense.   Finding 1: irregular mass measuring 11 millimeters posterior one-third of the left breast at 9 o'clock, in the central region and in the medial region. This finding is only seen on tomosynthesis.   Finding 2: Area of architectural distortion seen in the lateral region of the left breast.   In the right breast, no masses, significant calcifications or other abnormalities are seen.   IMPRESSION:   Finding 1: Requires additional evaluation.   Finding 2: Requires additional evaluation.   BIRADS Category 0 Incomplete     14       [facility]           LEFT DIAGNOSTIC MAMMOGRAM WITH TOMOSYNTHESIS       [place] A Mudge   The breast is heterogeneously dense.   Finding 1: irregular mass left breast 8 millimeters posterior one-third 10:30 o'clock region of the left breast located 6 centimeters from the nipple.   Finding 2: Area of architectural distortion in the left breast does not persist.   LEFT BREAST ULTRASOUND:  Finding 1: irregular taller-than-wide hypoechoic area with indisict margins measuring 5 x 3 x 2 mm.   Finding 2: There is no evidence of any solid mass or abnormal cystic elements.   IMPRESSION:   Finding 1: Suspicious   Finding 2: Benign-Negative   BIRADS Category 4A: Suspicious Abnormality  On bedside ultrasound, I can see the biopsy site at the LEFT breast 10;30 5.5 CFN location. Will use  needle loc.    07/15/15            [facility]            LEFT BREAST MAMMOGRAM WITH TOMOSYNTHESIS          [place] A Mudge   post biopsy follow-up.   the breast is heterogeneously dense.   Follow-up focal asymmetry in the left breast, 10:30 o'clock post surgical scar posterior one third medial left breast. In an area of prior excisional biopsy.   IMPRESSION:   Benign Negative  BI-RADS Category 2: Benign     11-13-15       [facility]       Bilateral Screening Mammogram with Tomosynthesis      Mudge, [place]  The breast are heterogenously dense,  stable post- surgical scar medial region of the left breast. in  an area of prior excisional biopsy.  ImpressioN  benign-negative  Birads Category 2:  Benign    11-13-15        [facility]        Bilateral Breast MRi         Mudge, [place]  posterior one third upper inner left breast 10 o'clock there is mild postsurgical architectural distortion.  Within the posterior one third retroareolar region of the right breast 7.3  cm  posterior to   the nipple there is linear, branching enhancement that extends 2.2 cm anterior  to posterior,  0.4 and 1.2 cm in superior to inferior Mammographically, there are no calcifications seen within   this region.  Impression:  1.  Postsurgical site left breast with enhancement of a typically benign reactive character.   The imaging features do not have a suspicious feature but are somewhat indeterminate and the  area is likely best evaluated with a follow up breast MRI in 6 months to 12 months.  2.  Linear, branching enhancement seen in the posterior one third of   retroareolar region of the right breast.  Correlation with an MR guided right breast  biopsy is recommended.  Birads Category 4: Suspicious    11-   [facility] BILATERAL SCREENING MAMMOGRAM WITH TOMOSYNTHESIS          M[place]GE, [place]  The breasts are heterogeneously dense.  Finding 1. Post-surgical scars upper outer region of both breasts.    Finding 2. Stable post-surgical scar upper inner left breast.     IMPRESSION:  BI-RADS Category 2: Benign    11-16-16                            [facility]                                  BILATERAL BREAST MRI                    MEET JONES  IMPRESSION:  1. Post breast conservation therapy changes in the left breast at the 9 o’clock position. I recommend the patient undergo 6 month mammographic follow-up of the left breast and MRI follow-up of both breast in 6 months to 12 months.  2. There are no findings suspicious for malignancy in the right breast.  BI-RADS Category 3: Probably benign    June 1, 2017 [facility]'s diagnostic [place] left diagnostic mammogram with 3-D. The breast is heterogenous leg dense. Fat containing focal asymmetry with postsurgical scar posterior one third upper inner left breast, 8 cm from the nipple. Calcifications are new compatible with oral cyst. Stable postsurgical scar posterior one third upper outer left breast at area of excisional biopsy. BI-RADS 3 recommend 6 month mammogram follow-up left calcifications.    June 5, 2017 bilateral breast MRI no findings suspicious for malignancy in either breast. BI-RADS 2.    [facility] November the 20th 2017 bilateral diagnostic mammogram with 3-D.  The breasts are heterogenous a dense.  Stable postsurgical scar upper outer right breast lumpectomy.  Stable postsurgical scar upper inner left breast.  Stable postsurgical scar lateral left breast.  BI-RADS 2.    Nov 21st 2018 [facility] bilateral screening mammogram with 3-D.  The breasts are heterogenous [place] dense.  Stable postsurgical scar in both breast.  BI-RADS 2.     [facility]: No findings suspicious for malignancy in either breast BI-RADS 2.    Bilateral breast MRI November 22, 2019 [facility].  No areas of abnormal enhancement in either breast BI-RADS 2.  Bilateral screening mammogram with 3D [facility]'s diagnostic [place] November 22, 2019.  Postsurgical scar on the  "right is benign.  Postsurgical scar upper outer left and upper inner left are benign.  BI-RADS 2.        Pathology:  12/11/14        [facility]           Left Ultrasound Guided Vacuum Assisted Biopsy                           Reyna Riggs  10:30 left breast  12 gauge  A total of 11 cores a(n) minicork shaped s-shereen tissue marker was placed at the biopsy site.   ADDENDUM 12-16-14:  Returned atypical lobular hyperplasia arising a radial scar.   Post clip mammogram demonstrates good position of the mini \"cork\" shaped clip at the 10:30 position of the left breast with no significant hematoma formation.     12/11/14        [facility]           Pathology Report            Reyna Riggs   Left Breast 10:30:  Atypical lobular hyperplasia arising in radial scar.     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       ESTIMATED EXTENT: 1.6 CM (LCIS PRESENT IN FOUR BLOCKS X 0.4 CM PER BLOCK        = 1.6 CM).       MARGINS FOCALLY INVOLVED (INFERIOR) see note.       FOCAL SCLEROSING FIBROSIS.       SCLEROSING ADENOSIS.       BIOPSY SITE CHANGE.    NOTE: Margins are difficult to evaluate due to extensive cautery artifact.   Recognizable LCIS is present focally at a cauterized inferior margin.  Immunostain for e-cadherin is negative within the carcinoma in situ areas   supportive of lobular carcinoma in situ. Internal and external controls are  appropriate. Reviewed with Dr. LESLEY Gregory who concurs.      12/01/15                  [facility]              MRI GUIDED MAMMOTOME VACUUM ASSISTED RIGHT BREAST BIOPSY                 Reyna Riggs Yulia   8-gauge   Multiple tissue specimens  Post biopsy mammogram was obtained demonstrating adequate placement of a metallic clip in th eposterior 1/3 retroareolar region of the " right breast.   The pathology result has returned as DCIS. This is concordant.     12/01/15                [facility]              RIGHT BREAST MRI BIOPSY            Pathology         Reyna Riggs   Diagnosis:   Right breast tissue, MRI directed biopsies:  Ductal carcinoma in situ, intermediate to high nuclear grade, without necrosis.   Hormone receptor studies pending   maximum span of 3mm.     12/01/15                       ER/ID                        Reyna Riggs  ER  0%  ID  0%      Received: 1/20/2016  Reported: 1/21/2016    Clinical Diagnosis and History       Right breast DCIS.        Oper: Right breast lumpectomy with MRI guided needle IOC and intraoperative  ultrasound.     Diagnosis  1: RIGHT BREAST LUMPECTOMY:   DUCT CARCINOMA IN SITU, INTERMEDIATE TO HIGH-GRADE WITH FOCAL NECROSIS.  ESTIMATED MAXIMUM DIMENSION OF DUCT CARCINOMA IN SITU IS 8 MM.  DUCT CARCINOMA IN SITU IS LESS THAN d MM FROM SUPERIOR MARGIN.   CHANGES OF PRIOR BIOPSY ARE NOTED.   (FOR ADDITIONAL DETAILS SEE SYNOPTIC REPORT BELOW).    2: ADDITIONAL RIGHT SUPERIOR MARGIN:       BENIGN BREAST TISSUE.     3: ADDITIONAL RIGHT INFERIOR MARGIN:       BENIGN BREAST TISSUE WITH FOCAL APOCRINE METAPLASIA.     4: ADDITIONAL RIGHT MEDIAL MARGIN:       BENIGN BREAST TISSUE WITH FOCAL ADENOSIS.     5: ADDITIONAL RIGHT LATERAL MARGIN:       BENIGN BREAST TISSUE.     6: ADDITIONAL RIGHT DEEP MARGIN:       BENIGN BREAST TISSUE.       SYNOPTIC REPORT (Based on [facility] Cancer Case Summary, version December 2013):       Procedure: Excision with wire-guided localization.        Specimen Laterality: Right.        Size (Extent) of DCIS: Estimated size of DCIS is at least 8 mm.             Comment: This dimension is based on the presence of duct carcinoma  present in two consecutively       obtained tissue blocks each measuring approximately 4 mm in thickness.        Histologic Type: Duct carcinoma in situ.        Nuclear Grade: Focally high grade (grade 3).         Necrosis: Focally present.        Margins: Margins uninvolved by duct carcinoma in situ.             Distance from Closest Margin: Less than d mm from superior margin of  specimen 1. Additionally       excised margins, including superior margin (specimen 2) are free of duct  carcinoma in situ.   Pathologic Staging:            Primary Tumor: pTis (DCIS).             Regional Lymph Nodes: pNX.        Ancillary Studies: ER and DC has been performed on prior biopsy material  ([id number]).       [facility]  [facility]      Procedures:      Assessment:   Diagnosis Plan   1. Ductal carcinoma in situ (DCIS) of right breast     2. 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 Radial scar of breast     4. Abnormal MRI, breast       1-  RIGHT breast DCIS  Mammographically occult  Seen on MRI as 2.2 x 1.2 cm, posterior 1/3, RA, 7 CFN,  area of enhancement- MRI guided biopsy returend as DCIS, int to high grade, ER0PR0.  Clinical stage TisN0    1-20-16 bracketed MRI guided needle loc lumpectomy- pathology returned as 8mm DCIS, focally high grade, focal necrosis, margins, clear-- closest is to superior margin- typographical error in report, called on this and was <1/2 mm from superior margin,  but additional superior margin clear.   Path stage TisN0- stage 0.    Dr. Parr from 3/29 2016 through April 8, 2016 whole breast plus boost over 5 and half weeks.  Dr. Munguia February 2016 Aromasin plus prolia- stopped aromasin 3-2017 due to arthralgias notably in hand- some improvement after starting meloxicam- restarted the aromasin- stopped meloxicam  after bump in creatinine    2-3  LEFT breast ALH in a radial scar UIQ- 10:30 5.5 CFN- cork clip-     1-7-15 excision ALH returned as upgrade to  LCIS, 1.6 cm, focally present at inferior margin.  Saw Dr Munguia, declined chemoprevention at that time    4--  [facility] MRI 4 mm focus of enhancement at excision site, BI-RADS 3- Br2 with complete resolution in 6-2017 MRI      Plan:  Reyna  OSCAR Riggs is doing well today at her followup visit.  She is 4  years out from her right breast duct carcinoma in situ and 3.5 years out from her excision of left breast atypical lobular hyperplasia.    She continues the aromasin and prolia and sees Dr Munguia for FU.    We reviewed her most recent MRI and bilateral screening mammogram with 3-D - both are in good order. Her exam is in good order.    I have not given her a routine follow-up in our office.  I did give her a reminder about her surveillance and that her next mammogram and MRI will be due November 23, 2020.  She has been having her mammograms [facility] and her MRI at [facility].  I did asked her to continue her self breast exam once a month and her clinical breast exam once a year along with her annual imaging as above.  I asked her to call us in the future with any concerns would be happy to see her back.      Shante Pierce MD      Next Appointment:  Return for any future concerns.      EMR Dragon/transcription disclaimer:    Much of this encounter note is an electronic transcription/translocation of spoken language to printed text.  The electronic translation of spoken language may permit erroneous, or at times, nonsensical words or phrases to be inadvertently transcribed.  Although I have reviewed the note from such areas, some may still exist.

## 2020-01-10 RX ORDER — EXEMESTANE 25 MG/1
TABLET ORAL
OUTPATIENT
Start: 2020-01-10

## 2020-01-10 RX ORDER — EXEMESTANE 25 MG/1
TABLET ORAL
Qty: 30 TABLET | Refills: 3 | Status: SHIPPED | OUTPATIENT
Start: 2020-01-10 | End: 2020-02-07

## 2020-01-29 ENCOUNTER — TELEPHONE (OUTPATIENT)
Dept: ONCOLOGY | Facility: CLINIC | Age: 75
End: 2020-01-29

## 2020-01-29 ENCOUNTER — TELEPHONE (OUTPATIENT)
Dept: ONCOLOGY | Facility: HOSPITAL | Age: 75
End: 2020-01-29

## 2020-01-29 NOTE — TELEPHONE ENCOUNTER
Message sent to appt desk to schedule pt with Dr. Munguia and call pt with appt info.     ----- Message from Shayne Munguia MD sent at 1/29/2020 12:09 PM EST -----  Fine to schedule next available  ----- Message -----  From: Karen Khan RN  Sent: 1/29/2020  12:06 PM EST  To: Shayne Munguia MD    Pt scheduled to see you 4/20-she called today with 2 issues and would like to see you sooner or have you call her to discuss.     She has appt with dentist, Dr. Raphael, tomorrow to clean up an infection that she has had in a dental implant for some time. She is questioning if she should avoid having dental implant replaced while she is on prolia. Last prolia injection was 10/10/19. Pt states she prefers to avoid dental work for the next year until she finishes exemestane and can stop prolia. She states dentist is aware she receives prolia and informed her that is probably why she is having a hard time healing from dental implant.   Pt also states she has been evaluated by PCP and then specialist, [facility]  for [facility] levels. Pt states Dr. Valdovinos is wanting her to have parathyroid biopsy of a mass that was seen on scan. Biopsy is not scheduled at this time. She wants to discuss with you first.     Do you want her scheduled to see you or do you want to call her?  Please let me know! Thanks

## 2020-01-29 NOTE — TELEPHONE ENCOUNTER
Pt calling with two issues she would like to discuss with Dr. Munguia. She is not scheduled to see him until April 20 and is questioning if she needs sooner appt.   Pt states she has appt with dentist, Dr. Raphael, tomorrow to clean up an infection that she has had in a dental implant for some time. Pt states this problem first started 2 years ago. Pt is questioning if she should avoid having dental implant replaced while she is on prolia. Last prolia injection was 10/10/19. Pt states she prefers to avoid dental work for the next year until she finishes exemestane and can stop prolia. She states dentist is aware she receives prolia and informed her that is probably why she is having a hard time healing from dental implant.   Pt also states she has been evaluated by PCP and then specialist, Dr. Shonna Valdovinos at [facility] for rising thyroid levels. Pt states Dr. Valdovinos is wanting her to have parathyroid biopsy of a mass that was seen on scan. Biopsy is not scheduled at this time. Pt states she is wanting to discuss with Dr. Munguia.   Message sent to Dr. Munguia to review if pt needs appt or if he can discuss these issues with her over the phone. Pt informed she would receive a call back with information. She v/u.

## 2020-02-07 RX ORDER — EXEMESTANE 25 MG/1
TABLET ORAL
Qty: 90 TABLET | Refills: 0 | Status: SHIPPED | OUTPATIENT
Start: 2020-02-07 | End: 2022-03-10

## 2020-02-17 ENCOUNTER — OFFICE VISIT (OUTPATIENT)
Dept: ONCOLOGY | Facility: CLINIC | Age: 75
End: 2020-02-17

## 2020-02-17 ENCOUNTER — APPOINTMENT (OUTPATIENT)
Dept: LAB | Facility: HOSPITAL | Age: 75
End: 2020-02-17

## 2020-02-17 VITALS
SYSTOLIC BLOOD PRESSURE: 125 MMHG | BODY MASS INDEX: 22.45 KG/M2 | WEIGHT: 131.5 LBS | OXYGEN SATURATION: 95 % | DIASTOLIC BLOOD PRESSURE: 77 MMHG | HEART RATE: 69 BPM | TEMPERATURE: 98.1 F | HEIGHT: 64 IN | RESPIRATION RATE: 12 BRPM

## 2020-02-17 DIAGNOSIS — D05.02 LOBULAR CARCINOMA IN SITU (LCIS) OF LEFT BREAST: ICD-10-CM

## 2020-02-17 DIAGNOSIS — D05.11 DUCTAL CARCINOMA IN SITU (DCIS) OF RIGHT BREAST: Primary | ICD-10-CM

## 2020-02-17 PROCEDURE — 99213 OFFICE O/P EST LOW 20 MIN: CPT | Performed by: INTERNAL MEDICINE

## 2020-02-17 NOTE — PROGRESS NOTES
REASONS FOR FOLLOW-UP:   1.  History of atypical lobular hyperplasia and LCIS of the breast diagnosed        2/2015   2.  Ductal carcinoma in situ, high-grade with necrosis, of the right breast        S/p lumpectomy 1/20/16 and  RT   3.  Chemoprevention with exemestane initiated February 2016   4.  Osteopenia (L hip T-score -2.1) on Prolia Q6 months    HISTORY OF PRESENT ILLNESS:     History of Present Illness  Mrs. Riggs returns today for follow-up of her above history.  She initiated chemoprevention with exemestane Feb 2016.  The patient return today with a few concerns.  She had a dental implant in the left jaw 2 or 3 years ago and has had problems since with recurrent infection and poor healing.  She also has been diagnosed with parathyroid adenoma being followed by Dr. Valdovinos at [facility].  Parathyroidectomy has been recommended.          Oncology/Hematology History    1. 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   2.  ductal carcinom a in situ, high grade with focal necrosis of the right breast status post lumpectomy on 01/20/2016 with negative margins; DCIS is ER/TN negative; patient received post-lumpectomy radiation therapy  3. Chemoprevention initiated with Exemestane February 2016        Ductal carcinoma in situ (DCIS) of right breast    3/16/2016 Initial Diagnosis     Ductal carcinoma in situ (DCIS) of right breast         Past Medical History, Past Surgical History, Social History, Family History have been reviewed and are without significant changes except as mentioned.    Review of Systems   Constitutional: Negative.    HENT: Positive for dental problem.    Respiratory: Negative.    Cardiovascular: Negative.    Gastrointestinal: Negative.    Genitourinary: Negative.    Musculoskeletal: Positive for  "arthralgias.   Skin: Negative.    Neurological: Negative.    Hematological: Negative.           Medications:  The current medication list was reviewed in the EMR    ALLERGIES:  No Known Allergies    Objective      Vitals:    02/17/20 1207   BP: 125/77   Pulse: 69   Resp: 12   Temp: 98.1 °F (36.7 °C)   TempSrc: Oral   SpO2: 95%   Weight: 59.6 kg (131 lb 8 oz)   Height: 162.6 cm (64.02\")   PainSc: 0-No pain     Current Status 2/17/2020   ECOG score 0       Physical Exam   Constitutional: She is oriented to person, place, and time. She appears well-developed and well-nourished.   HENT:   Head: Normocephalic and atraumatic.   Cardiovascular: Normal rate and regular rhythm.   Pulmonary/Chest: Effort normal. No respiratory distress. She has no wheezes.   Abdominal: She exhibits no distension.   Neurological: She is alert and oriented to person, place, and time.   Skin: Skin is warm and dry.   Psychiatric: She has a normal mood and affect.      Exam unchanged- 2/17/20    RECENT LABS:  Hematology WBC   Date Value Ref Range Status   10/10/2019 5.12 3.40 - 10.80 10*3/mm3 Final     RBC   Date Value Ref Range Status   10/10/2019 4.20 3.77 - 5.28 10*6/mm3 Final     Hemoglobin   Date Value Ref Range Status   10/10/2019 13.1 12.0 - 15.9 g/dL Final     Hematocrit   Date Value Ref Range Status   10/10/2019 39.9 34.0 - 46.6 % Final     Platelets   Date Value Ref Range Status   10/10/2019 185 140 - 450 10*3/mm3 Final      DEXA:   osteopenia T-score -2.0 left hip on 3/22/18    Assessment/Plan       1.  High risk breast lesions including previous lobular hyperplasia and lobular carcinoma in situ of the left breast, followed by ductal carcinoma in situ high-grade of the right breast status post lumpectomy and  radiation therapy.  Given her high risk findings for increased risk of invasive breast cancer, the patient is undergoing chemoprevention with exemestane initiated February 2016 anticipating 5 years of chemoprevention.  She " continues routine breast surveillance and exams with Dr. Pierce.      3.  Osteopenia:  Given the risk of further bone loss with her aromatase inhibitor, we initiated Prolia 3/18/16.  The patient is having trouble with poor wound healing of a dental implant she has not been diagnosed with osteonecrosis.  We discussed pros and cons of her Prolia today.  I think given the issues with her dental implant the risk of continuing Prolia at this time outweigh benefit especially since she has osteopenia, not osteoporosis.  We will hold off on further Prolia at this time.  She has not repeat bone density due in March.  I will see her back to review the results.  If she has further bone loss compared to the previous exam we will consider either stopping the AI a bit earlier than 5 years or alternatively changing therapy to tamoxifen to complete the last year of therapy.    4.  chronic mild hypercalcemia:  this has been evaluated by her PCP.  She has been diagnosed with possible hyperparathyroidism and seeing Dr. Valdovinos at [facility].                2/17/2020      CC:

## 2020-03-23 ENCOUNTER — HOSPITAL ENCOUNTER (OUTPATIENT)
Dept: BONE DENSITY | Facility: HOSPITAL | Age: 75
Discharge: HOME | End: 2020-03-23
Admitting: INTERNAL MEDICINE

## 2020-03-23 DIAGNOSIS — M85.80 OSTEOPENIA, UNSPECIFIED LOCATION: ICD-10-CM

## 2020-03-23 DIAGNOSIS — Z79.811 AROMATASE INHIBITOR USE: ICD-10-CM

## 2020-03-23 PROCEDURE — 77080 DXA BONE DENSITY AXIAL: CPT

## 2020-04-10 ENCOUNTER — TELEMEDICINE (OUTPATIENT)
Dept: ONCOLOGY | Facility: CLINIC | Age: 75
End: 2020-04-10

## 2020-04-10 ENCOUNTER — APPOINTMENT (OUTPATIENT)
Dept: LAB | Facility: HOSPITAL | Age: 75
End: 2020-04-10

## 2020-04-10 DIAGNOSIS — D05.02 LOBULAR CARCINOMA IN SITU OF LEFT BREAST: Primary | ICD-10-CM

## 2020-04-10 PROCEDURE — 99212 OFFICE O/P EST SF 10 MIN: CPT | Performed by: INTERNAL MEDICINE

## 2020-04-10 NOTE — PROGRESS NOTES
REASONS FOR FOLLOW-UP:   1.  History of atypical lobular hyperplasia and LCIS of the breast diagnosed        2/2015   2.  Ductal carcinoma in situ, high-grade with necrosis, of the right breast        S/p lumpectomy 1/20/16 and  RT   3.  Chemoprevention with exemestane initiated February 2016   4.  Osteoporosis    HISTORY OF PRESENT ILLNESS:     History of Present Illness  Mrs. Riggs returns today for follow-up of her above history.  She initiated chemoprevention with exemestane Feb 2016.  She had a dental implant in the left jaw 2 or 3 years ago and has had problems since with recurrent infection and poor healing.  She also has been diagnosed with parathyroid adenoma being followed by Dr. Valdovinos at [facility].  Parathyroidectomy has been recommended but delayed because of COVID-19.  We stopped Prolia because of her dental issues.    I saw her today in follow up by video visit; 11 minutes face to face spent with the patient.    She denies new breast related concerns.  Still has untreated hyperparathyroidism as her surgery was deemed elective and cancelled because of the COVID pandemic.          Oncology/Hematology History    1. 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   2.  ductal carcinom a in situ, high grade with focal necrosis of the right breast status post lumpectomy on 01/20/2016 with negative margins; DCIS is ER/IL negative; patient received post-lumpectomy radiation therapy  3. Chemoprevention initiated with Exemestane February 2016        Ductal carcinoma in situ (DCIS) of right breast    3/16/2016 Initial Diagnosis     Ductal carcinoma in situ (DCIS) of right breast         Past Medical History, Past Surgical History, Social History, Family History have been reviewed and are without significant changes except as  mentioned.    Review of Systems   Constitutional: Negative.    HENT: Positive for dental problem.    Respiratory: Negative.    Cardiovascular: Negative.    Gastrointestinal: Negative.    Genitourinary: Negative.    Musculoskeletal: Positive for arthralgias.   Skin: Negative.    Neurological: Negative.    Hematological: Negative.           Medications:  The current medication list was reviewed in the EMR    ALLERGIES:  No Known Allergies    Objective      There were no vitals filed for this visit.  Current Status 2/17/2020   ECOG score 0       Exam  Gen; well develop well nourished  HEENT: hearing intact, no icterus  RESP even nonlabored normal wob  PSYCH: normal mood affect and judgement  SKIN: no rash or induration noted     RECENT LABS:  Hematology WBC   Date Value Ref Range Status   10/10/2019 5.12 3.40 - 10.80 10*3/mm3 Final     RBC   Date Value Ref Range Status   10/10/2019 4.20 3.77 - 5.28 10*6/mm3 Final     Hemoglobin   Date Value Ref Range Status   10/10/2019 13.1 12.0 - 15.9 g/dL Final     Hematocrit   Date Value Ref Range Status   10/10/2019 39.9 34.0 - 46.6 % Final     Platelets   Date Value Ref Range Status   10/10/2019 185 140 - 450 10*3/mm3 Final      DEXA 3/23/20:     FINDINGS: Lumbar T score is  -0.7, representing a 9.3% interval  increase.      Left hip density is lowest at the  femoral neck where the T score is  -2.1, unchanged.       Right hip density is lowest at the  femoral neck where the T score is  -2.8, not statistically changed.       IMPRESSION:  Osteoporosis of the right hip. Interval increase in bone  density.       Assessment/Plan       1.  High risk breast lesions including previous lobular hyperplasia and lobular carcinoma in situ of the left breast, followed by ductal carcinoma in situ high-grade of the right breast status post lumpectomy and  radiation therapy.  Given her high risk findings for increased risk of invasive breast cancer, the patient is undergoing chemoprevention with  exemestane initiated February 2016.  Because of her osteoporosis, inability to take Prolia secondary to poorly healing dental implant, and untreated hyperparathyroidism which can worsen bone loss I think risk of continued AI therapy on bone loss outweighs benefits for the patient.  She has completed over 4 years of therapy and I recommended she d/c exemestane at this time.  The patient was agreeable.      3.  Osteopenia/osteoporosis:  Her recent DEXA report stable to improved osteoporosis although previous DEXA read osteopenia.  This scan commented on right hip being osteoporotic whereas last scan mentioned left hip.  At any rate, she has osteoporosis and will DC AI therapy as above.      4.  chronic mild hypercalcemia:  [facility]athy[facility]    The patient states she will follow up with Dr. Pallares her PCP for breast exams and imaging.  I will be happy to see her back as needed.                4/10/2020      CC:

## 2020-04-20 ENCOUNTER — APPOINTMENT (OUTPATIENT)
Dept: LAB | Facility: HOSPITAL | Age: 75
End: 2020-04-20

## 2020-05-11 RX ORDER — EXEMESTANE 25 MG/1
TABLET ORAL
Refills: 0 | OUTPATIENT
Start: 2020-05-11

## 2020-11-11 ENCOUNTER — TRANSCRIBE ORDERS (OUTPATIENT)
Dept: ADMINISTRATIVE | Facility: HOSPITAL | Age: 75
End: 2020-11-11

## 2020-11-11 DIAGNOSIS — R10.9 RIGHT FLANK PAIN: Primary | ICD-10-CM

## 2020-11-23 ENCOUNTER — HOSPITAL ENCOUNTER (OUTPATIENT)
Dept: GENERAL RADIOLOGY | Facility: HOSPITAL | Age: 75
Discharge: HOME | End: 2020-11-23

## 2020-11-23 ENCOUNTER — HOSPITAL ENCOUNTER (OUTPATIENT)
Dept: ULTRASOUND IMAGING | Facility: HOSPITAL | Age: 75
Discharge: HOME | End: 2020-11-23

## 2020-11-23 DIAGNOSIS — R52 PAIN: ICD-10-CM

## 2020-11-23 DIAGNOSIS — R10.9 RIGHT FLANK PAIN: ICD-10-CM

## 2020-11-23 PROCEDURE — 76775 US EXAM ABDO BACK WALL LIM: CPT

## 2020-11-23 PROCEDURE — 72072 X-RAY EXAM THORAC SPINE 3VWS: CPT

## 2020-12-10 NOTE — PROGRESS NOTES
Chief Complaint: Reyna Riggs is a 73 y.o. 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  She noted no masses, skin changes, nipple discharge, nipple changes prior to her most recent imaging.       Her most recent imaging includes:  08/19/09          [facility]        Yearly screening mammogram          Yulia B. Mudge  The breasts are heterogeneously dense.   BIRADS Category 1: Negative     08/27/10        [facility]         Yearly screening mammogram         Yulia B. Mudge   The breasts are heterogeneously dense.   BIRADS Category 1: Negative     09/08/11         [facility]         Yearly screening mammogram        Yulia B Mudge   The breasts are heterogeneously dense.   There is a stable area of architectural distortion with associated post-surgical scar seen in the upper outer region of the left breast. No significant change. Patient has reported has prior excisional left breast biopsy to account for this finding.   BIRADS Category 2: Benign     09/17/12        [facility]         Yearly screening mammogram        Yulia B Mudge   The breasts are heterogeneously dense.   Architectural distortion is in and area of prior excisional biopsy. No significant change.   BIRADS Category 2: Benign     09/19/13        [facility]          Yearly screening mammogram           Yulia B Mudge   The breasts are heterogeneously dense.   Stable area of architectural distortion upper outer region of the left breast.   BIRADS Category 2: Benign     11/12/14          [facility]           BILATERAL SCREENING MAMMOGRAM WITH TOMOSYNTHESIS       Reyna Riggs   The breasts are heterogeneously dense.  Immunization chart prep completed.  Immunization records verified.  Eric Blackmon due for Boostrix (Tdap), Gardasil 9 (HPV), Influenza and Menveo (Meningitis)   "  Finding 1: irregular mass measuring 11 millimeters posterior one-third of the left breast at 9 o'clock, in the central region and in the medial region. This finding is only seen on tomosynthesis.   Finding 2: Area of architectural distortion seen in the lateral region of the left breast.   In the right breast, no masses, significant calcifications or other abnormalities are seen.   IMPRESSION:   Finding 1: Requires additional evaluation.   Finding 2: Requires additional evaluation.   BIRADS Category 0 Incomplete     12/02/14       [facility]           LEFT DIAGNOSTIC MAMMOGRAM WITH TOMOSYNTHESIS       Reyna Riggs   The breast is heterogeneously dense.   Finding 1: irregular mass left breast 8 millimeters posterior one-third 10:30 o'clock region of the left breast located 6 centimeters from the nipple.   Finding 2: Area of architectural distortion in the left breast does not persist.   LEFT BREAST ULTRASOUND:  Finding 1: irregular taller-than-wide hypoechoic area with indisict margins measuring 5 x 3 x 2 mm.   Finding 2: There is no evidence of any solid mass or abnormal cystic elements.   IMPRESSION:   Finding 1: Suspicious   Finding 2: Benign-Negative   BIRADS Category 4A: Suspicious Abnormality    She had a biopsy on the following day that showed:    12/11/14        [facility]           Left Ultrasound Guided Vacuum Assisted Biopsy                           Reyna OSCAR Keely  10:30 left breast  12 gauge  A total of 11 cores a(n) minicork shaped s-shereen tissue marker was placed at the biopsy site.   ADDENDUM 12-16-14:  Returned atypical lobular hyperplasia arising a radial scar.   Post clip mammogram demonstrates good position of the mini \"cork\" shaped clip at the 10:30 position of the left breast with no significant hematoma formation.     12/11/14        [facility]           Pathology Report            Reyna Riggs   Left Breast 10:30:  Atypical lobular hyperplasia arising in radial scar.       She has had  2 excisional breast " biopsies  in the past, LEFT breast, benign, age 42.  She has her uterus and ovaries, is postmenopausal, and takes no hormones.  Her family history includes the following: She has one sister, one brother, 2 daughters, 3 MA, 3 MU, 3 PU, 3 PA. No breast or ovarian cancer in her family.     We called Dr Melton to clarify preoperative CXR mention of tiny hyperdense nodules in the RIGHT lung- he stated not worrisome, no additional imaging or FU needed.    We went to the operating room on 1-7-15 for lumpectomy for excision atypical hyperplasia.    1-7-15 excision ALH returned as LCIS, 1.6 cm, focally present at inferior margin.    In the interim,  Yulia  has done well.  She has noted no changes in her breast exam. No new masses, skin changes,  nipple changes, nipple discharge either breast.   She denies headache, bone pain, belly pain, cough, changes in vision or gait.  She met with Dr Munguia and they decided not to start chemoprevention.      Her most recent imaging includes the followin/15/15            [facility]            LEFT BREAST MAMMOGRAM WITH TOMOSYNTHESIS          [place] A Mudge     post biopsy follow-up.   the breast is heterogeneously dense.   Follow-up focal asymmetry in the left breast, 10:30 o'clock post surgical scar posterior one third medial left breast. In an area of prior excisional biopsy.   IMPRESSION:   Benign Negative  BI-RADS Category 2: Benign     In the interim,  Yulia  has done well.  She has noted no changes in her breast exam. No new masses, skin changes,  nipple changes, nipple discharge either breast.   She denies headache, bone pain, belly pain, cough, changes in vision or gait.    Her most recent imaging includes the followin-13-15       [facility]       Bilateral Screening Mammogram with Tomosynthesis      Mudge, [place]  The breast are heterogenously dense,  stable post- surgical scar medial region of the left breast. in  an area of prior excisional  biopsy.  ImpressioN  benign-negative  Birads Category 2:  Benign    11-13-15        [facility]        Bilateral Breast MRi         Reyna Riggs  posterior one third upper inner left breast 10 o'clock there is mild postsurgical architectural distortion.  Within the posterior one third retroareolar region of the right breast 7.3  cm  posterior to   the nipple there is linear, branching enhancement that extends 2.2 cm anterior  to posterior,  0.4 and 1.2 cm in superior to inferior Mammographically, there are no calcifications seen within   this region.  Impression:  1.  Postsurgical site left breast with enhancement of a typically benign reactive character.   The imaging features do not have a suspicious feature but are somewhat indeterminate and the  area is likely best evaluated with a follow up breast MRI in 6 months to 12 months.  2.  Linear, branching enhancement seen in the posterior one third of   retroareolar region of the right breast.  Correlation with an MR guided right breast  biopsy is recommended.  Birads Category 4: Suspicious      I then arranged for a RIGHT breast MRI guided biopsy:    12/01/15                  [facility]              MRI GUIDED MAMMOTOME VACUUM ASSISTED RIGHT BREAST BIOPSY                 Reyna Riggs Yulia   8-gauge   Multiple tissue specimens  Post biopsy mammogram was obtained demonstrating adequate placement of a metallic clip in th eposterior 1/3 retroareolar region of the right breast.   The pathology result has returned as DCIS. This is concordant.     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   Hormone receptor studies pending   maximum span of 3mm.     12/01/15                       ER/PA                        Reyna Riggs  ER  0%  PA  0%      We went to the operating room on 1-20-16  for bracketted MRI guided needle loc lumpectomy- pathology returned as 8mm DCIS, focally high grade, focal necrosis, margins, clear-- closest is to superior margin- typographical error in report, called on this and was <1/2 mm from superior margin,  but additional superior margin clear.   Path stage TisN0- stage 0.    She reports some lightning pain in her nipple. Denies any redness, warmth, drainage.      In the interim,  Reyna Riggs  has done well.  She took whole breast plus boost radiation 6 weeks with Dr. Parr from February 29, 2016 through April 8, 2016.  She tolerated this well.    She started Aromasin) Marie up with Dr. Munguia February 2016.  She is noticed a 5 pound weight gain.  Otherwise she feels that she is tolerating a well.    She has noted no changes in her breast exam. No new masses, skin changes, nipple changes, nipple discharge either breast.   She denies headache, bone pain, belly pain, cough, changes in vision or gait.  Her most recent imaging includes the following:  [facility] with [facility] November 14, 2016 heterogenous a dense tissue.  Post surgical scar in both breast.  BI-RADS 2.  [facility] bilateral breast MRI November 16, 2016.  Peripheral enhancement focally prominent 4 mm posterior one third medial left breast.  No finding at the right breast directly site.  Recommend six-month left mammogram and 6 versus 12 month MRI.  BI-RADS 3.      In the interim,  Reyna Riggs  has done well.  She has noted no changes in her breast exam. No new masses, skin changes, nipple changes, nipple discharge either breast.   She denies headache, bone pain, belly pain, cough, changes in vision or gait.    She has had trouble with LEFT hand pain and some arthritis, so she stopped her aromasin in March.  She did not notice a great deal of improvement until she was started on meloxicam by Dr Pallares.    Her most recent imaging includes the  following:  [facility] June 1, 2017 bilateral breast MRI for follow-up abnormal MRI finding.  There is been resolution of all abnormal enhancement in the left breast since prior examination.  BI-RADS 2 benign.  [facility] 2017 left diagnostic mammogram with 3-D.  The breast is heterogenous a dense.  Focal asymmetry posterior one third upper inner left breast at the site of her procedure likely calcified oil cyst or fat necrosis.  Follow up mammogram in 6 months is recommended BI-RADS 3.        In the interim,  Reyna Riggs  has done well.  She has noted no changes in her breast exam. No new masses, skin changes, nipple changes, nipple discharge either breast.   She denies headache, bone pain, belly pain, cough, changes in vision or gait.  Her most recent imaging includes the following:  [facility] November [facility] 20th 2017 bilateral diagnostic mammogram with 3-D.  The breasts are heterogenous a dense.  Stable postsurgical scar upper outer right breast lumpectomy.  Stable postsurgical scar upper inner left breast.  Stable postsurgical scar lateral left breast.  BI-RADS 2.    She restarted the aromasin with meloxicam and is weaning off of the meloxicam.    Her weight is 5 pounds less thatn last visit.    Interval History:    In the interim,  Reyna Riggs  has done well.  She has noted no changes in her breast exam. No new masses, skin changes, nipple changes, nipple discharge either breast.   She denies headache, bone pain, belly pain, cough, changes in vision or gait.    Her most recent imaging includes the following:  [facility].  The breasts are heterogenously dense.  Stable postsurgical scar in both breast.  BI-RADS 2.     [facility] bilateral breast MRI November 21, 2018: No findings suspicious for malignancy in either breast BI-RADS 2.    She is here fore review.    Review of  Systems:  Review of Systems   Constitutional: Negative for unexpected weight change (5 lb wt loss).   All other systems reviewed and are negative.       Past Medical and Surgical History:  Breast Biopsy History:  Patient has had the following breast biopsies:2 prior excisional biopsies LEFT breast around age 42. UOQ and lateral.  Breast Cancer HIstory:  Patient does not have a past medical history of breast cancer.  Breast Operations, and year:  None   Obstetric/Gynecologic History:  Age menstrual periods began:15  Patient is postmenopausal, entered menopause naturally at age: 52   Number of pregnancies:2  Number of live births: 2  Number of abortions or miscarriages: 0  Age of delivery of first child: 27  Patient did not breast feed.  Length of time taking birth control pills:n/a  Patient has never taken hormone replacement  The patient still has her uterus and ovaries.      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        Prior Hospitalizations, other than for surgery or childbirth, and year:  None     Social History     Socioeconomic History   • Marital status:      Spouse name: Juan David   • Number of children: 2   • Years of education: College   • Highest education level: Not on file   Social Needs   • Financial resource strain: Not on file   • Food insecurity - worry: Not on file   • Food insecurity - inability: Not on file   • Transportation needs - medical: Not on file   • Transportation needs - non-medical: Not on file   Occupational History   • Occupation: Retired     Employer: [employer]     Comment: Worked in Human Resources  "  Tobacco Use   • Smoking status: Former Smoker     Packs/day: 1.00     Years: 10.00     Pack years: 10.00   • Smokeless tobacco: Never Used   Substance and Sexual Activity   • Alcohol use: Yes     Comment: 1 GLASS OF ALCOHOL DAILY   • Drug use: No   • Sexual activity: Not on file   Other Topics Concern   • Not on file   Social History Narrative    She is very active. Enjoys golfing, walking, sewing, etc.     Patient is .  Patient is retired.  Patient drinks 1 servings of caffeine per day.    Family History:  Family History   Problem Relation Age of Onset   • Esophageal cancer Father 74   • Kidney cancer Brother 53   • Diabetes Other    • Diabetes Mother    • Diabetes Sister        Vital Signs:  /78 (BP Location: Left arm, Patient Position: Sitting, Cuff Size: Adult)   Pulse 71   Ht 161 cm (63.39\")   Wt 61.7 kg (136 lb)   LMP  (LMP Unknown)   SpO2 98%   Breastfeeding? No   BMI 23.80 kg/m²      Medications:    Current Outpatient Medications:   •  aspirin 81 MG tablet, Take 81 mg by mouth daily., Disp: , Rfl:   •  Cholecalciferol (VITAMIN D) 1000 UNITS tablet, Take 1,000 Units by mouth daily., Disp: , Rfl:   •  exemestane (AROMASIN) 25 MG chemo tablet, TAKE 1 TABLET BY MOUTH DAILY, Disp: 30 tablet, Rfl: 0  •  exemestane (AROMASIN) 25 MG chemo tablet, TAKE 1 TABLET BY MOUTH DAILY, Disp: 30 tablet, Rfl: 5  •  meloxicam (MOBIC) 7.5 MG tablet, Take 7.5 mg by mouth Daily., Disp: , Rfl:   •  Multiple Vitamin (MULTI-VITAMIN PO), Take  by mouth., Disp: , Rfl:   •  sertraline (ZOLOFT) 25 MG tablet, Take 25 mg by mouth daily., Disp: , Rfl:   •  amoxicillin-clavulanate (AUGMENTIN) 875-125 MG per tablet, Take 1 tablet by mouth 2 (Two) Times a Day., Disp: 20 tablet, Rfl: 0  •  guaifenesin-codeine (GUAIFENESIN AC) 100-10 MG/5ML liquid, Take 5 mL by mouth 3 (Three) Times a Day As Needed for Cough., Disp: 118 mL, Rfl: 0     Allergies:  No Known Allergies    Physical Examination:  /78 (BP Location: Left arm, " "Patient Position: Sitting, Cuff Size: Adult)   Pulse 71   Ht 161 cm (63.39\")   Wt 61.7 kg (136 lb)   LMP  (LMP Unknown)   SpO2 98%   Breastfeeding? No   BMI 23.80 kg/m²   General Appearance:  Patient is in no distress.  She is well kept and has an average  build.   Psychiatric:  Patient with appropriate mood and affect. Alert and oriented to self, time, and place.    Breast, RIGHT: large  sized, asymmetric just slightly larger than the LEFT breast.   Breast skin is without erythema, edema, rashes.  There are no visible abnormalities upon inspection during the arm-raising maneuver or with hands on hips in the sitting position. There is no nipple retraction, discharge or nipple/areolar skin changes.There are no masses palpable in the sitting or supine positions. There is a well healed RIGHT lateral radial incision  from 1-2016 lumpectomy for DCIS.    Breast, LEFT:  large  sized, asymmetric just slightly smaller than the RIGHT breast .  Breast skin is without erythema, edema, rashes.  There are no visible abnormalities upon inspection during the arm-raising maneuver or with hands on hips in the sitting position. There is no nipple retraction, discharge or nipple/areolar skin changes.There are no masses palpable in the sitting or supine positions. There is a well healed UIQ curvilinear incision from her lumpectomy for ALH/upgrade to LCIS. There is a slight tissue void at the site of lumpectomy.    Lymphatic:  There is no axillary, cervical, infraclavicular, or supraclavicular adenopathy bilaterally.  Eyes:  Pupils are round and reactive to light.  Cardiovascular:  Heart rate and rhythm are regular.  Respiratory:  Lungs are clear bilaterally with no crackles or wheezes in any lung field.  Gastrointestinal:  Abdomen is soft, nondistended, and nontender.  Musculoskeletal:  Good strength in all 4 extremities.  There is good range of motion in both shoulders.   Skin:  No new skin lesions or rashes on the skin " excluding the breast (see breast exam above).        Imagin/12/14          [facility]           BILATERAL SCREENING MAMMOGRAM WITH TOMOSYNTHESIS       [place] A Mudge   The breasts are heterogeneously dense.   Finding 1: irregular mass measuring 11 millimeters posterior one-third of the left breast at 9 o'clock, in the central region and in the medial region. This finding is only seen on tomosynthesis.   Finding 2: Area of architectural distortion seen in the lateral region of the left breast.   In the right breast, no masses, significant calcifications or other abnormalities are seen.   IMPRESSION:   Finding 1: Requires additional evaluation.   Finding 2: Requires additional evaluation.   BIRADS Category 0 Incomplete     14       [facility]           LEFT DIAGNOSTIC MAMMOGRAM WITH TOMOSYNTHESIS       Warren A Mudge   The breast is heterogeneously dense.   Finding 1: irregular mass left breast 8 millimeters posterior one-third 10:30 o'clock region of the left breast located 6 centimeters from the nipple.   Finding 2: Area of architectural distortion in the left breast does not persist.   LEFT BREAST ULTRASOUND:  Finding 1: irregular taller-than-wide hypoechoic area with indisict margins measuring 5 x 3 x 2 mm.   Finding 2: There is no evidence of any solid mass or abnormal cystic elements.   IMPRESSION:   Finding 1: Suspicious   Finding 2: Benign-Negative   BIRADS Category 4A: Suspicious Abnormality  On bedside ultrasound, I can see the biopsy site at the LEFT breast 10;30 5.5 CFN location. Will use needle loc.    07/15/15            [facility]            LEFT BREAST MAMMOGRAM WITH TOMOSYNTHESIS          War[place] A Mudge   post biopsy follow-up.   the breast is heterogeneously dense.   Follow-up focal asymmetry in the left breast, 10:30 o'clock post surgical scar posterior one third medial left breast. In an area of prior excisional biopsy.   IMPRESSION:   Benign Negative  BI-RADS Category 2: Benign     11-13-15       [facility]        Bilateral Screening Mammogram with Tomosynthesis      [place]udge, [place]  The breast are heterogenously dense,  stable post- surgical scar medial region of the left breast. in  an area of prior excisional biopsy.  ImpressioN  benign-negative  Birads Category 2:  Benign    11-13-15        [facility]        Bilateral Breast MRi         Mudge, [place]  posterior one third upper inner left breast 10 o'clock there is mild postsurgical architectural distortion.  Within the posterior one third retroareolar region of the right breast 7.3  cm  posterior to   the nipple there is linear, branching enhancement that extends 2.2 cm anterior  to posterior,  0.4 and 1.2 cm in superior to inferior Mammographically, there are no calcifications seen within   this region.  Impression:  1.  Postsurgical site left breast with enhancement of a typically benign reactive character.   The imaging features do not have a suspicious feature but are somewhat indeterminate and the  area is likely best evaluated with a follow up breast MRI in 6 months to 12 months.  2.  Linear, branching enhancement seen in the posterior one third of   retroareolar region of the right breast.  Correlation with an MR guided right breast  biopsy is recommended.  Birads Category 4: Suspicious    11-   [facility] BILATERAL SCREENING MAMMOGRAM WITH TOMOSYNTHESIS          MUDGE, [place]  The breasts are heterogeneously dense.  Finding 1. Post-surgical scars upper outer region of both breasts.    Finding 2. Stable post-surgical scar upper inner left breast.    IMPRESSION:  BI-RADS Category 2: Benign    11-16-16                            [facility]                                  BILATERAL BREAST MRI                    MUDGE, [place]RTLE  IMPRESSION:  1. Post breast conservation therapy changes in the left breast at the 9 o’clock position. I recommend the patient undergo 6 month mammographic follow-up of the left breast and MRI follow-up of both breast in 6 months to 12 months.  2. There are  "no findings suspicious for malignancy in the right breast.  BI-RADS Category 3: Probably benign    June 1, 2017 [facility] left diagnostic mammogram with 3-D. The breast is heterogenous leg dense. Fat containing focal asymmetry with postsurgical scar posterior one third upper inner left breast, 8 cm from the nipple. Calcifications are new compatible with oral cyst. Stable postsurgical scar posterior one third upper outer left breast at area of excisional biopsy. BI-RADS 3 recommend 6 month mammogram follow-up left calcifications.    June 5, 2017 bilateral breast MRI no findings suspicious for malignancy in either breast. BI-RADS 2.    [facility] November the 20th 2017 bilateral diagnostic mammogram with 3-D.  The breasts are heterogenous a dense.  Stable postsurgical scar upper outer right breast lumpectomy.  Stable postsurgical scar upper inner left breast.  Stable postsurgical scar lateral left breast.  BI-RADS 2.    Nov 21st 2018 [facility] bilateral screening mammogram with 3-D.  The breasts are heterogenous [place] dense.  Stable postsurgical scar in both breast.  BI-RADS 2.     [facility] bilateral breast MRI November 21, 2018: No findings suspicious for malignancy in either breast BI-RADS 2.    Pathology:  12/11/14        [facility]           Left Ultrasound Guided Vacuum Assisted Biopsy                           Reyna Riggs  10:30 left breast  12 gauge  A total of 11 cores a(n) minicork shaped s-shereen tissue marker was placed at the biopsy site.   ADDENDUM 12-16-14:  Returned atypical lobular hyperplasia arising a radial scar.   Post clip mammogram demonstrates good position of the mini \"cork\" shaped clip at the 10:30 position of the left breast with no significant hematoma formation.     12/11/14        [facility]           Pathology Report            Reyna Riggs   Left Breast 10:30:  Atypical lobular hyperplasia arising in radial scar.     Collected: " "1/7/2015  Clinical Diagnosis and History       Let breast atypical hyperplasia       Operation: Left Breast Mammo needle loc lumpectomy  for permanent     Diagnosis  \"LEFT BREAST LUMPECTOMY\", WIRE GUIDED:       LOBULAR CARCINOMA IN SITU with pagetoid involvement of ducts.       ESTIMATED EXTENT: 1.6 CM (LCIS PRESENT IN FOUR BLOCKS X 0.4 CM PER BLOCK        = 1.6 CM).       MARGINS FOCALLY INVOLVED (INFERIOR) see note.       FOCAL SCLEROSING FIBROSIS.       SCLEROSING ADENOSIS.       BIOPSY SITE CHANGE.    NOTE: Margins are difficult to evaluate due to extensive cautery artifact.   Recognizable LCIS is present focally at a cauterized inferior margin.  Immunostain for e-cadherin is negative within the carcinoma in situ areas   supportive of lobular carcinoma in situ. Internal and external controls are  appropriate. Reviewed with Dr. LESLEY Gregory who concurs.      12/01/15                  [facility]              MRI GUIDED MAMMOTOME VACUUM ASSISTED RIGHT BREAST BIOPSY                 Reyna Riggs Yulia   8-gauge   Multiple tissue specimens  Post biopsy mammogram was obtained demonstrating adequate placement of a metallic clip in th eposterior 1/3 retroareolar region of the right breast.   The pathology result has returned as DCIS. This is concordant.     12/01/15                [facility]              RIGHT BREAST MRI BIOPSY            Pathology         Reyna Riggs   Diagnosis:   Right breast tissue, MRI directed biopsies:  Ductal carcinoma in situ, intermediate to high nuclear grade, without necrosis.   Hormone receptor studies pending   maximum span of 3mm.     12/01/15                       ER/HI                        Reyna Riggs  ER  0%  HI  0%      Received: 1/20/2016  Reported: 1/21/2016    Clinical Diagnosis and History       Right breast DCIS.        Oper: Right breast lumpectomy with MRI guided needle IOC and intraoperative  ultrasound.     Diagnosis  1: RIGHT BREAST LUMPECTOMY:   DUCT CARCINOMA IN SITU, INTERMEDIATE TO " HIGH-GRADE WITH FOCAL NECROSIS.  ESTIMATED MAXIMUM DIMENSION OF DUCT CARCINOMA IN SITU IS 8 MM.  DUCT CARCINOMA IN SITU IS LESS THAN d MM FROM SUPERIOR MARGIN.   CHANGES OF PRIOR BIOPSY ARE NOTED.   (FOR ADDITIONAL DETAILS SEE SYNOPTIC REPORT BELOW).    2: ADDITIONAL RIGHT SUPERIOR MARGIN:       BENIGN BREAST TISSUE.     3: ADDITIONAL RIGHT INFERIOR MARGIN:       BENIGN BREAST TISSUE WITH FOCAL APOCRINE METAPLASIA.     4: ADDITIONAL RIGHT MEDIAL MARGIN:       BENIGN BREAST TISSUE WITH FOCAL ADENOSIS.     5: ADDITIONAL RIGHT LATERAL MARGIN:       BENIGN BREAST TISSUE.     6: ADDITIONAL RIGHT DEEP MARGIN:       BENIGN BREAST TISSUE.       SYNOPTIC REPORT (Based on [facility] Cancer Case Summary, version December 2013):       Procedure: Excision with wire-guided localization.        Specimen Laterality: Right.        Size (Extent) of DCIS: Estimated size of DCIS is at least 8 mm.             Comment: This dimension is based on the presence of duct carcinoma  present in two consecutively       obtained tissue blocks each measuring approximately 4 mm in thickness.        Histologic Type: Duct carcinoma in situ.        Nuclear Grade: Focally high grade (grade 3).        Necrosis: Focally present.        Margins: Margins uninvolved by duct carcinoma in situ.             Distance from Closest Margin: Less than d mm from superior margin of  specimen 1. Additionally       excised margins, including superior margin (specimen 2) are free of duct  carcinoma in situ.   Pathologic Staging:            Primary Tumor: pTis (DCIS).             Regional Lymph Nodes: pNX.        Ancillary Studies: ER and PA has been performed on prior biopsy material  ([id number]).       CMK/hmf  IHC/THM      Procedures:      Assessment:   Diagnosis Plan   1. Ductal carcinoma in situ (DCIS) of right breast  MRI Breast Bilateral With & Without Contrast    Mammo Screening Digital Tomosynthesis Bilateral With CAD   2. 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 Radial scar of breast     4. Abnormal MRI, breast     5. Encounter for screening mammogram for malignant neoplasm of breast   Mammo Screening Digital Tomosynthesis Bilateral With CAD     1-  RIGHT breast DCIS  Mammographically occult  Seen on MRI as 2.2 x 1.2 cm, posterior 1/3, RA, 7 CFN,  area of enhancement- MRI guided biopsy returend as DCIS, int to high grade, ER0PR0.  Clinical stage TisN0    1-20-16 bracketted MRI guided needle loc lumpectomy- pathology returned as 8mm DCIS, focally high grade, focal necrosis, margins, clear-- closest is to superior margin- typographical error in report, called on this and was <1/2 mm from superior margin,  but additional superior margin clear.   Path stage TisN0- stage 0.      Dr. Parr from 3/29 2016 through April 8, 2016 whole breast plus boost over 5 and half weeks.  Dr. Munguia February 2016 Aromasin plus prolia- stopped aromasin 3-2017 due to arthralgias notably in hand- some improvement after starting meloxicam- restarted the aromasin    2-3  LEFT breast ALH in a radial scar UIQ- 10:30 5.5 CFN- cork clip-     1-7-15 excision ALH returned as upgrade to  LCIS, 1.6 cm, focally present at inferior margin.  Saw Dr Munguia, declined chemoprevention    4 -  [facility] MRI 4 mm focus of enhancement at excision site, BI-RADS 3- Br2 with complete resolution in 6-2017 MRI      Plan:  Reyna Riggs is doing well today at her followup visit.  She is 3.5 years out from her right breast duct carcinoma in situ and 2-1/2 years out from her excision of left breast atypical lobular hyperplasia.    She continues the aromasin and prolia and sees Dr Munguia for FU.    We reviewed her most recent MRI and bilateral screening mammogram with 3-D - both are in good order. Her exam is in good order  We will plan for a bilateral screening mammogram and a bilateral breast MRI for November 22,  2019.  I will see her back after.  I've asked her to continue her self breast exam and to call me interim with any concerns or changes and we'd be happy to see her back sooner.  We did review her surveillance together today. We discussed that she may consider stopping the MRI at some point in her surveillance. SHe thinks perhaps after she stops the tamoxifen.            Shante Pierce MD        We have spent 15 minutes in visit today, 9 in face to face .      Next Appointment:  Return for Next scheduled follow up, after imaging.      EMR Dragon/transcription disclaimer:    Much of this encounter note is an electronic transcription/translocation of spoken language to printed text.  The electronic translation of spoken language may permit erroneous, or at times, nonsensical words or phrases to be inadvertently transcribed.  Although I have reviewed the note from such areas, some may still exist.

## 2020-12-23 ENCOUNTER — APPOINTMENT (OUTPATIENT)
Dept: WOMENS IMAGING | Facility: HOSPITAL | Age: 75
End: 2020-12-23

## 2020-12-23 PROCEDURE — 77063 BREAST TOMOSYNTHESIS BI: CPT | Performed by: RADIOLOGY

## 2020-12-23 PROCEDURE — 77067 SCR MAMMO BI INCL CAD: CPT | Performed by: RADIOLOGY

## 2021-02-15 ENCOUNTER — TRANSCRIBE ORDERS (OUTPATIENT)
Dept: ADMINISTRATIVE | Facility: HOSPITAL | Age: 76
End: 2021-02-15

## 2021-02-15 DIAGNOSIS — Z85.3 PERSONAL HISTORY OF MALIGNANT NEOPLASM OF BREAST: Primary | ICD-10-CM

## 2021-03-09 DIAGNOSIS — Z23 IMMUNIZATION DUE: ICD-10-CM

## 2021-03-17 ENCOUNTER — HOSPITAL ENCOUNTER (OUTPATIENT)
Dept: MRI IMAGING | Facility: HOSPITAL | Age: 76
Discharge: HOME | End: 2021-03-17
Admitting: NURSE PRACTITIONER

## 2021-03-17 DIAGNOSIS — Z85.3 PERSONAL HISTORY OF MALIGNANT NEOPLASM OF BREAST: ICD-10-CM

## 2021-03-17 PROCEDURE — C8908 MRI W/O FOL W/CONT, BREAST,: HCPCS

## 2021-03-17 PROCEDURE — C8937 CAD BREAST MRI: HCPCS

## 2021-03-17 PROCEDURE — 82565 ASSAY OF CREATININE: CPT

## 2021-03-17 PROCEDURE — A9577 INJ MULTIHANCE: HCPCS | Performed by: NURSE PRACTITIONER

## 2021-03-17 PROCEDURE — 0 GADOBENATE DIMEGLUMINE 529 MG/ML SOLUTION: Performed by: NURSE PRACTITIONER

## 2021-03-17 RX ADMIN — GADOBENATE DIMEGLUMINE 12 ML: 529 INJECTION, SOLUTION INTRAVENOUS at 10:13

## 2021-03-18 LAB — CREAT BLDA-MCNC: 0.8 MG/DL (ref 0.6–1.3)

## 2021-04-09 ENCOUNTER — TRANSCRIBE ORDERS (OUTPATIENT)
Dept: ADMINISTRATIVE | Facility: HOSPITAL | Age: 76
End: 2021-04-09

## 2021-04-09 DIAGNOSIS — M54.10 BACK PAIN WITH RADICULOPATHY: Primary | ICD-10-CM

## 2021-04-16 ENCOUNTER — HOSPITAL ENCOUNTER (OUTPATIENT)
Dept: MRI IMAGING | Facility: HOSPITAL | Age: 76
Discharge: HOME | End: 2021-04-16
Admitting: NURSE PRACTITIONER

## 2021-04-16 DIAGNOSIS — M54.10 BACK PAIN WITH RADICULOPATHY: ICD-10-CM

## 2021-04-16 PROCEDURE — 72146 MRI CHEST SPINE W/O DYE: CPT

## 2021-10-20 ENCOUNTER — TRANSCRIBE ORDERS (OUTPATIENT)
Dept: ADMINISTRATIVE | Facility: HOSPITAL | Age: 76
End: 2021-10-20

## 2021-10-20 DIAGNOSIS — R10.9 ABDOMINAL PAIN, UNSPECIFIED ABDOMINAL LOCATION: Primary | ICD-10-CM

## 2021-10-25 ENCOUNTER — HOSPITAL ENCOUNTER (OUTPATIENT)
Dept: CT IMAGING | Facility: HOSPITAL | Age: 76
Discharge: HOME | End: 2021-10-25
Admitting: INTERNAL MEDICINE

## 2021-10-25 DIAGNOSIS — R10.9 ABDOMINAL PAIN, UNSPECIFIED ABDOMINAL LOCATION: ICD-10-CM

## 2021-10-25 PROCEDURE — 74150 CT ABDOMEN W/O CONTRAST: CPT

## 2021-11-17 ENCOUNTER — APPOINTMENT (OUTPATIENT)
Dept: CT IMAGING | Facility: HOSPITAL | Age: 76
End: 2021-11-17

## 2021-12-20 ENCOUNTER — TRANSCRIBE ORDERS (OUTPATIENT)
Dept: ADMINISTRATIVE | Facility: HOSPITAL | Age: 76
End: 2021-12-20

## 2021-12-20 DIAGNOSIS — Z85.3 PERSONAL HISTORY OF MALIGNANT NEOPLASM OF BREAST: ICD-10-CM

## 2021-12-20 DIAGNOSIS — Z78.0 POSTMENOPAUSAL: Primary | ICD-10-CM

## 2022-01-11 ENCOUNTER — APPOINTMENT (OUTPATIENT)
Dept: WOMENS IMAGING | Facility: HOSPITAL | Age: 77
End: 2022-01-11

## 2022-01-11 PROCEDURE — 77063 BREAST TOMOSYNTHESIS BI: CPT | Performed by: RADIOLOGY

## 2022-01-11 PROCEDURE — 77067 SCR MAMMO BI INCL CAD: CPT | Performed by: RADIOLOGY

## 2022-03-10 ENCOUNTER — OFFICE VISIT (OUTPATIENT)
Dept: ONCOLOGY | Facility: CLINIC | Age: 77
End: 2022-03-10

## 2022-03-10 ENCOUNTER — LAB (OUTPATIENT)
Dept: LAB | Facility: HOSPITAL | Age: 77
End: 2022-03-10

## 2022-03-10 VITALS
RESPIRATION RATE: 14 BRPM | SYSTOLIC BLOOD PRESSURE: 123 MMHG | OXYGEN SATURATION: 97 % | BODY MASS INDEX: 24.82 KG/M2 | HEIGHT: 63 IN | HEART RATE: 72 BPM | DIASTOLIC BLOOD PRESSURE: 68 MMHG | WEIGHT: 140.1 LBS | TEMPERATURE: 96.9 F

## 2022-03-10 DIAGNOSIS — D70.8 OTHER NEUTROPENIA: Primary | ICD-10-CM

## 2022-03-10 DIAGNOSIS — D05.11 DUCTAL CARCINOMA IN SITU (DCIS) OF RIGHT BREAST: ICD-10-CM

## 2022-03-10 DIAGNOSIS — D05.11 DUCTAL CARCINOMA IN SITU (DCIS) OF RIGHT BREAST: Primary | ICD-10-CM

## 2022-03-10 PROBLEM — D05.91 CARCINOMA IN SITU OF RIGHT BREAST: Status: RESOLVED | Noted: 2017-06-13 | Resolved: 2022-03-10

## 2022-03-10 PROBLEM — D05.02 LOBULAR CARCINOMA IN SITU (LCIS) OF LEFT BREAST: Status: RESOLVED | Noted: 2017-12-13 | Resolved: 2022-03-10

## 2022-03-10 LAB
BASOPHILS # BLD AUTO: 0.06 10*3/MM3 (ref 0–0.2)
BASOPHILS NFR BLD AUTO: 1.6 % (ref 0–1.5)
DEPRECATED RDW RBC AUTO: 42.5 FL (ref 37–54)
EOSINOPHIL # BLD AUTO: 0.29 10*3/MM3 (ref 0–0.4)
EOSINOPHIL NFR BLD AUTO: 7.6 % (ref 0.3–6.2)
ERYTHROCYTE [DISTWIDTH] IN BLOOD BY AUTOMATED COUNT: 12.5 % (ref 12.3–15.4)
FOLATE SERPL-MCNC: >20 NG/ML (ref 4.78–24.2)
HCT VFR BLD AUTO: 45.9 % (ref 34–46.6)
HGB BLD-MCNC: 15.3 G/DL (ref 12–15.9)
IMM GRANULOCYTES # BLD AUTO: 0.02 10*3/MM3 (ref 0–0.05)
IMM GRANULOCYTES NFR BLD AUTO: 0.5 % (ref 0–0.5)
LYMPHOCYTES # BLD AUTO: 0.65 10*3/MM3 (ref 0.7–3.1)
LYMPHOCYTES NFR BLD AUTO: 16.9 % (ref 19.6–45.3)
MCH RBC QN AUTO: 30.7 PG (ref 26.6–33)
MCHC RBC AUTO-ENTMCNC: 33.3 G/DL (ref 31.5–35.7)
MCV RBC AUTO: 92 FL (ref 79–97)
MONOCYTES # BLD AUTO: 0.41 10*3/MM3 (ref 0.1–0.9)
MONOCYTES NFR BLD AUTO: 10.7 % (ref 5–12)
NEUTROPHILS NFR BLD AUTO: 2.41 10*3/MM3 (ref 1.7–7)
NEUTROPHILS NFR BLD AUTO: 62.7 % (ref 42.7–76)
NRBC BLD AUTO-RTO: 0 /100 WBC (ref 0–0.2)
PLATELET # BLD AUTO: 149 10*3/MM3 (ref 140–450)
PMV BLD AUTO: 11 FL (ref 6–12)
RBC # BLD AUTO: 4.99 10*6/MM3 (ref 3.77–5.28)
VIT B12 BLD-MCNC: 718 PG/ML (ref 211–946)
WBC NRBC COR # BLD: 3.84 10*3/MM3 (ref 3.4–10.8)

## 2022-03-10 PROCEDURE — 85025 COMPLETE CBC W/AUTO DIFF WBC: CPT

## 2022-03-10 PROCEDURE — 82746 ASSAY OF FOLIC ACID SERUM: CPT | Performed by: INTERNAL MEDICINE

## 2022-03-10 PROCEDURE — 99214 OFFICE O/P EST MOD 30 MIN: CPT | Performed by: INTERNAL MEDICINE

## 2022-03-10 PROCEDURE — 36415 COLL VENOUS BLD VENIPUNCTURE: CPT

## 2022-03-10 PROCEDURE — 82607 VITAMIN B-12: CPT | Performed by: INTERNAL MEDICINE

## 2022-03-10 RX ORDER — SERTRALINE HYDROCHLORIDE 25 MG/1
25 TABLET, FILM COATED ORAL
COMMUNITY
Start: 2020-12-02

## 2022-03-10 RX ORDER — ROSUVASTATIN CALCIUM 5 MG/1
5 TABLET, COATED ORAL DAILY
COMMUNITY
Start: 2022-02-22

## 2022-03-10 RX ORDER — TRIAMCINOLONE ACETONIDE 1 MG/G
15 CREAM TOPICAL
COMMUNITY
Start: 2021-12-15 | End: 2022-03-10

## 2022-03-10 NOTE — PROGRESS NOTES
REASONS FOR FOLLOW-UP: Neutropenia       HISTORY OF PRESENT ILLNESS:     History of Present Illness  Mrs. Riggs returns today for follow-up of her above history.  She initiated chemoprevention with exemestane Feb 2016.  She had a dental implant in the left jaw 2 or 3 years ago and has had problems since with recurrent infection and poor healing.   We stopped Prolia because of her dental issues and hyperparathyroidism.  Exemestane was discontinued 4/10/20 after completing 4 years secondary to inability to treat osteoporosis, risk/side effects of medication outweighing benefit.    The patient is referred back by her primary care provider for evaluation of neutropenia.  A CBC with differential on 10/11/2021 showed a white blood cell count of 3.1, hemoglobin 14.6, platelet 207 and differential showing normal neutrophils 1.9 ANC and low lymphocytes 524 otherwise unremarkable differential.  A repeat CBC with differential on 1/11/2022 showed a total white blood cell count 2.8 with differential mild neutropenia 1.4 ANC, normal lymphocytes 980 otherwise normal differential, normal hemoglobin and normal platelet count.  Valtrex was discontinued as a possible etiology of her low white blood cell count.    The patient has in general felt well.  She denies any recent infections, fevers or chills.  She denies recent weight loss.  She denies lymphadenopathy.  She denies significant fatigue.  She has occasional spasming of muscles in the mid back which has been investigated with imaging studies being negative.        Oncology/Hematology History Overview Note   1. 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   2.  ductal carcinom a in situ, high grade with focal necrosis of the right breast status post lumpectomy on 01/20/2016 with negative margins; DCIS is  "ER/UT negative; patient received post-lumpectomy radiation therapy  3. Chemoprevention initiated with Exemestane February 2016     Ductal carcinoma in situ (DCIS) of right breast   3/16/2016 Initial Diagnosis    Ductal carcinoma in situ (DCIS) of right breast         Past Medical History, Past Surgical History, Social History, Family History have been reviewed and are without significant changes except as mentioned.    Review of Systems   Constitutional: Negative.    HENT: Negative for dental problem.    Respiratory: Negative.    Cardiovascular: Negative.    Gastrointestinal: Negative.    Genitourinary: Negative.    Musculoskeletal: Positive for arthralgias and back pain (Intermittent spasming).   Skin: Negative.    Neurological: Negative.    Hematological: Negative.           Medications:  The current medication list was reviewed in the EMR    ALLERGIES:  No Known Allergies    Objective      Vitals:    03/10/22 1023   BP: 123/68   Pulse: 72   Resp: 14   Temp: 96.9 °F (36.1 °C)   TempSrc: Infrared   SpO2: 97%   Weight: 63.5 kg (140 lb 1.6 oz)   Height: 159.3 cm (62.72\")   PainSc: 0-No pain     Current Status 2/17/2020   ECOG score 0       Exam  CONSTITUTIONAL: pleasant well-developed adult woman  HEENT: no icterus, no thrush, moist membranes  LYMPH: no cervical or supraclavicular lad  GI: soft, non-tender, no splenomegaly, +bs  MUSC: no edema, normal gait  NEURO: alert and oriented x3, normal strength  PSYCH: normal mood    RECENT LABS:  Hematology WBC   Date Value Ref Range Status   03/10/2022 3.84 3.40 - 10.80 10*3/mm3 Final     RBC   Date Value Ref Range Status   03/10/2022 4.99 3.77 - 5.28 10*6/mm3 Final     Hemoglobin   Date Value Ref Range Status   03/10/2022 15.3 12.0 - 15.9 g/dL Final     Hematocrit   Date Value Ref Range Status   03/10/2022 45.9 34.0 - 46.6 % Final     Platelets   Date Value Ref Range Status   03/10/2022 149 140 - 450 10*3/mm3 Final      DEXA 3/23/20:     FINDINGS: Lumbar T score is  -0.7, " representing a 9.3% interval  increase.      Left hip density is lowest at the  femoral neck where the T score is  -2.1, unchanged.       Right hip density is lowest at the  femoral neck where the T score is  -2.8, not statistically changed.       IMPRESSION:  Osteoporosis of the right hip. Interval increase in bone  density.       Assessment/Plan       1.  High risk breast lesions including previous lobular hyperplasia and lobular carcinoma in situ of the left breast, followed by ductal carcinoma in situ high-grade of the right breast status post lumpectomy and  radiation therapy:    · Given her high risk findings for increased risk of invasive breast cancer, the patient took chemoprevention with exemestane initiated February 2016.  Because of her osteoporosis, inability to take Prolia secondary to poorly healing dental implant, and untreated hyperparathyroidism which can worsen bone loss I think risk of continued AI therapy on bone loss outweighs benefits for the patient.  She completed over 4 years of therapy and I recommended she d/c exemestane April 2020 which she did.  She has followed with her PCP since that time.    2.  Leukopenia:  · The patient has chronic mild lymphopenia without significant infectious complications  · CBC 10/11/2021 white blood cell count 3.1, ANC 1.9, ALC 0.5, no immature cells, anemia or thrombocytopenia  · CBC 1/11/2022 white blood cell count 2.8, ANC 1.4, , no immature cells, anemia or thrombocytopenia-Valtrex discontinued  · CBC 3/10/2022 white blood cell count 3.8, hemoglobin 15.3, platelets 149, ANC 2.4, ALC 0.65, no immature cells, anemia or thrombocytopenia    Hematology plan/recommendations:  The patient has a fairly normal CBC today other than mild lymphopenia which is a common finding for her.  She is asymptomatic.  We will repeat her CBC in about 3 months.  I will check her folic acid and B12 levels.            3/10/2022      CC:

## 2022-03-11 ENCOUNTER — TELEPHONE (OUTPATIENT)
Dept: ONCOLOGY | Facility: CLINIC | Age: 77
End: 2022-03-11

## 2022-03-11 NOTE — TELEPHONE ENCOUNTER
----- Message from Shayne Munguia MD sent at 3/11/2022  7:33 AM EST -----  Please let the patient know her folate and B12 levels returned normal.

## 2022-03-18 ENCOUNTER — HOSPITAL ENCOUNTER (OUTPATIENT)
Dept: MRI IMAGING | Facility: HOSPITAL | Age: 77
Discharge: HOME | End: 2022-03-18
Admitting: INTERNAL MEDICINE

## 2022-03-18 DIAGNOSIS — Z85.3 PERSONAL HISTORY OF MALIGNANT NEOPLASM OF BREAST: ICD-10-CM

## 2022-03-18 PROCEDURE — A9577 INJ MULTIHANCE: HCPCS | Performed by: INTERNAL MEDICINE

## 2022-03-18 PROCEDURE — 82565 ASSAY OF CREATININE: CPT

## 2022-03-18 PROCEDURE — C8937 CAD BREAST MRI: HCPCS

## 2022-03-18 PROCEDURE — 0 GADOBENATE DIMEGLUMINE 529 MG/ML SOLUTION: Performed by: INTERNAL MEDICINE

## 2022-03-18 PROCEDURE — C8908 MRI W/O FOL W/CONT, BREAST,: HCPCS

## 2022-03-18 RX ADMIN — GADOBENATE DIMEGLUMINE 13 ML: 529 INJECTION, SOLUTION INTRAVENOUS at 12:25

## 2022-03-25 LAB — CREAT BLDA-MCNC: 0.7 MG/DL (ref 0.6–1.3)

## 2022-06-10 ENCOUNTER — OFFICE VISIT (OUTPATIENT)
Dept: ONCOLOGY | Facility: CLINIC | Age: 77
End: 2022-06-10

## 2022-06-10 ENCOUNTER — LAB (OUTPATIENT)
Dept: LAB | Facility: HOSPITAL | Age: 77
End: 2022-06-10

## 2022-06-10 VITALS
SYSTOLIC BLOOD PRESSURE: 125 MMHG | DIASTOLIC BLOOD PRESSURE: 69 MMHG | OXYGEN SATURATION: 96 % | TEMPERATURE: 96.9 F | HEART RATE: 68 BPM | RESPIRATION RATE: 16 BRPM | WEIGHT: 140.8 LBS | HEIGHT: 63 IN | BODY MASS INDEX: 24.95 KG/M2

## 2022-06-10 DIAGNOSIS — D70.8 OTHER NEUTROPENIA: ICD-10-CM

## 2022-06-10 DIAGNOSIS — D05.02 LOBULAR CARCINOMA IN SITU OF LEFT BREAST: Primary | ICD-10-CM

## 2022-06-10 LAB
BASOPHILS # BLD AUTO: 0.06 10*3/MM3 (ref 0–0.2)
BASOPHILS NFR BLD AUTO: 0.8 % (ref 0–1.5)
DEPRECATED RDW RBC AUTO: 42.3 FL (ref 37–54)
EOSINOPHIL # BLD AUTO: 0.28 10*3/MM3 (ref 0–0.4)
EOSINOPHIL NFR BLD AUTO: 3.7 % (ref 0.3–6.2)
ERYTHROCYTE [DISTWIDTH] IN BLOOD BY AUTOMATED COUNT: 12.7 % (ref 12.3–15.4)
HCT VFR BLD AUTO: 42.9 % (ref 34–46.6)
HGB BLD-MCNC: 14.2 G/DL (ref 12–15.9)
IMM GRANULOCYTES # BLD AUTO: 0.01 10*3/MM3 (ref 0–0.05)
IMM GRANULOCYTES NFR BLD AUTO: 0.1 % (ref 0–0.5)
LYMPHOCYTES # BLD AUTO: 0.74 10*3/MM3 (ref 0.7–3.1)
LYMPHOCYTES NFR BLD AUTO: 9.9 % (ref 19.6–45.3)
MCH RBC QN AUTO: 30.1 PG (ref 26.6–33)
MCHC RBC AUTO-ENTMCNC: 33.1 G/DL (ref 31.5–35.7)
MCV RBC AUTO: 91.1 FL (ref 79–97)
MONOCYTES # BLD AUTO: 0.59 10*3/MM3 (ref 0.1–0.9)
MONOCYTES NFR BLD AUTO: 7.9 % (ref 5–12)
NEUTROPHILS NFR BLD AUTO: 5.79 10*3/MM3 (ref 1.7–7)
NEUTROPHILS NFR BLD AUTO: 77.6 % (ref 42.7–76)
NRBC BLD AUTO-RTO: 0 /100 WBC (ref 0–0.2)
PLATELET # BLD AUTO: 193 10*3/MM3 (ref 140–450)
PMV BLD AUTO: 9.7 FL (ref 6–12)
RBC # BLD AUTO: 4.71 10*6/MM3 (ref 3.77–5.28)
WBC NRBC COR # BLD: 7.47 10*3/MM3 (ref 3.4–10.8)

## 2022-06-10 PROCEDURE — 99213 OFFICE O/P EST LOW 20 MIN: CPT | Performed by: INTERNAL MEDICINE

## 2022-06-10 PROCEDURE — 85025 COMPLETE CBC W/AUTO DIFF WBC: CPT

## 2022-06-10 PROCEDURE — 36415 COLL VENOUS BLD VENIPUNCTURE: CPT

## 2022-06-10 NOTE — PROGRESS NOTES
REASONS FOR FOLLOW-UP: Neutropenia       HISTORY OF PRESENT ILLNESS:     History of Present Illness  Mrs. Riggs returns today for follow-up of her above history.  She initiated chemoprevention with exemestane Feb 2016.  She had a dental implant in the left jaw 2 or 3 years ago and has had problems since with recurrent infection and poor healing.   We stopped Prolia because of her dental issues and hyperparathyroidism.  Exemestane was discontinued 4/10/20 after completing 4 years secondary to inability to treat osteoporosis, risk/side effects of medication outweighing benefit.    The patient is referred back by her primary care provider for evaluation of neutropenia.  A CBC with differential on 10/11/2021 showed a white blood cell count of 3.1, hemoglobin 14.6, platelet 207 and differential showing normal neutrophils 1.9 ANC and low lymphocytes 524 otherwise unremarkable differential.  A repeat CBC with differential on 1/11/2022 showed a total white blood cell count 2.8 with differential mild neutropenia 1.4 ANC, normal lymphocytes 980 otherwise normal differential, normal hemoglobin and normal platelet count.  Valtrex was discontinued as a possible etiology of her low white blood cell count.    The patient returned today feeling well no unusual fevers, chills, infections, weight loss or lymphadenopathy.        Oncology/Hematology History Overview Note   1. 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   2.  ductal carcinom a in situ, high grade with focal necrosis of the right breast status post lumpectomy on 01/20/2016 with negative margins; DCIS is ER/AL negative; patient received post-lumpectomy radiation therapy  3. Chemoprevention initiated with Exemestane February 2016     Ductal carcinoma in situ (DCIS) of right breast   3/16/2016  "Initial Diagnosis    Ductal carcinoma in situ (DCIS) of right breast         Past Medical History, Past Surgical History, Social History, Family History have been reviewed and are without significant changes except as mentioned.    Review of Systems   Constitutional: Negative.    HENT: Negative for dental problem.    Respiratory: Negative.    Cardiovascular: Negative.    Gastrointestinal: Negative.    Genitourinary: Negative.    Musculoskeletal: Positive for arthralgias. Negative for back pain.   Skin: Negative.    Neurological: Negative.    Hematological: Negative.           Medications:  The current medication list was reviewed in the EMR    ALLERGIES:  No Known Allergies    Objective      Vitals:    06/10/22 1352   BP: 125/69   Pulse: 68   Resp: 16   Temp: 96.9 °F (36.1 °C)   TempSrc: Temporal   SpO2: 96%   Weight: 63.9 kg (140 lb 12.8 oz)   Height: 159.3 cm (62.72\")   PainSc: 0-No pain     Current Status 6/10/2022   ECOG score 0       Exam  CONSTITUTIONAL: pleasant well-developed adult woman  HEENT: no icterus, no thrush, moist membranes  LYMPH: no cervical or supraclavicular lad  GI: soft, non-tender, no splenomegaly, +bs  MUSC: no edema, normal gait  NEURO: alert and oriented x3, normal strength  PSYCH: normal mood  Exam is unchanged-6/10/2022  RECENT LABS:  Hematology WBC   Date Value Ref Range Status   06/10/2022 7.47 3.40 - 10.80 10*3/mm3 Final     RBC   Date Value Ref Range Status   06/10/2022 4.71 3.77 - 5.28 10*6/mm3 Final     Hemoglobin   Date Value Ref Range Status   06/10/2022 14.2 12.0 - 15.9 g/dL Final     Hematocrit   Date Value Ref Range Status   06/10/2022 42.9 34.0 - 46.6 % Final     Platelets   Date Value Ref Range Status   06/10/2022 193 140 - 450 10*3/mm3 Final      DEXA 3/23/20:     FINDINGS: Lumbar T score is  -0.7, representing a 9.3% interval  increase.      Left hip density is lowest at the  femoral neck where the T score is  -2.1, unchanged.       Right hip density is lowest at the  " femoral neck where the T score is  -2.8, not statistically changed.       IMPRESSION:  Osteoporosis of the right hip. Interval increase in bone  density.       Assessment & Plan       1.  High risk breast lesions including previous lobular hyperplasia and lobular carcinoma in situ of the left breast, followed by ductal carcinoma in situ high-grade of the right breast status post lumpectomy and  radiation therapy:    · Given her high risk findings for increased risk of invasive breast cancer, the patient took chemoprevention with exemestane initiated February 2016.  Because of her osteoporosis, inability to take Prolia secondary to poorly healing dental implant, and untreated hyperparathyroidism which can worsen bone loss I think risk of continued AI therapy on bone loss outweighs benefits for the patient.  She completed over 4 years of therapy and I recommended she d/c exemestane April 2020 which she did.  She has followed with her PCP since that time.    2.  Leukopenia:  · The patient has chronic mild lymphopenia without significant infectious complications  · CBC 10/11/2021 white blood cell count 3.1, ANC 1.9, ALC 0.5, no immature cells, anemia or thrombocytopenia  · CBC 1/11/2022 white blood cell count 2.8, ANC 1.4, , no immature cells, anemia or thrombocytopenia-Valtrex discontinued  · CBC 3/10/2022 white blood cell count 3.8, hemoglobin 15.3, platelets 149, ANC 2.4, ALC 0.65, no immature cells, anemia or thrombocytopenia  · B12 and folic acid levels normal 3/10/2022  · CBC 6/10/2022 remains normal with WBC 7.47, hemoglobin 14.2, platelets 193, ANC 5.79    Hematology plan/recommendations:  The patient's CBC remains normal.  I will defer follow-up back to her primary care provider; I would recommend continued monitoring the CBC every 6 to 12 months.  I would be happy to see her back if needed.        6/10/2022      CC:

## 2022-06-17 ENCOUNTER — HOSPITAL ENCOUNTER (OUTPATIENT)
Dept: BONE DENSITY | Facility: HOSPITAL | Age: 77
Discharge: HOME | End: 2022-06-17
Admitting: INTERNAL MEDICINE

## 2022-06-17 DIAGNOSIS — Z78.0 POSTMENOPAUSAL: ICD-10-CM

## 2022-06-17 PROCEDURE — 77080 DXA BONE DENSITY AXIAL: CPT

## 2022-12-15 ENCOUNTER — TRANSCRIBE ORDERS (OUTPATIENT)
Dept: ADMINISTRATIVE | Facility: HOSPITAL | Age: 77
End: 2022-12-15

## 2022-12-15 DIAGNOSIS — Z85.3 PERSONAL HISTORY OF BREAST CANCER: Primary | ICD-10-CM

## 2023-01-17 ENCOUNTER — APPOINTMENT (OUTPATIENT)
Dept: WOMENS IMAGING | Facility: HOSPITAL | Age: 78
End: 2023-01-17
Payer: MEDICARE

## 2023-01-17 PROCEDURE — 77063 BREAST TOMOSYNTHESIS BI: CPT | Performed by: RADIOLOGY

## 2023-01-17 PROCEDURE — 77067 SCR MAMMO BI INCL CAD: CPT | Performed by: RADIOLOGY

## 2023-03-20 ENCOUNTER — HOSPITAL ENCOUNTER (OUTPATIENT)
Dept: MRI IMAGING | Facility: HOSPITAL | Age: 78
Discharge: HOME | End: 2023-03-20
Admitting: INTERNAL MEDICINE
Payer: MEDICARE

## 2023-03-20 DIAGNOSIS — Z85.3 PERSONAL HISTORY OF BREAST CANCER: ICD-10-CM

## 2023-03-20 PROCEDURE — A9577 INJ MULTIHANCE: HCPCS | Performed by: INTERNAL MEDICINE

## 2023-03-20 PROCEDURE — C8908 MRI W/O FOL W/CONT, BREAST,: HCPCS

## 2023-03-20 PROCEDURE — 82565 ASSAY OF CREATININE: CPT

## 2023-03-20 PROCEDURE — C8937 CAD BREAST MRI: HCPCS

## 2023-03-20 PROCEDURE — 0 GADOBENATE DIMEGLUMINE 529 MG/ML SOLUTION: Performed by: INTERNAL MEDICINE

## 2023-03-20 RX ADMIN — GADOBENATE DIMEGLUMINE 13 ML: 529 INJECTION, SOLUTION INTRAVENOUS at 08:58

## 2023-03-21 LAB — CREAT BLDA-MCNC: 0.8 MG/DL (ref 0.6–1.3)

## 2023-03-30 ENCOUNTER — OFFICE VISIT (OUTPATIENT)
Dept: ORTHOPEDIC SURGERY | Facility: CLINIC | Age: 78
End: 2023-03-30
Payer: MEDICARE

## 2023-03-30 VITALS — WEIGHT: 143.4 LBS | HEIGHT: 64 IN | BODY MASS INDEX: 24.48 KG/M2 | TEMPERATURE: 97.8 F

## 2023-03-30 DIAGNOSIS — R52 PAIN: Primary | ICD-10-CM

## 2023-03-30 PROBLEM — E05.90 HYPERTHYROIDISM: Status: ACTIVE | Noted: 2023-03-30

## 2023-03-30 PROBLEM — B00.9 HERPES SIMPLEX VIRUS (HSV) INFECTION: Status: ACTIVE | Noted: 2023-03-30

## 2023-03-30 PROCEDURE — 99203 OFFICE O/P NEW LOW 30 MIN: CPT | Performed by: ORTHOPAEDIC SURGERY

## 2023-03-30 RX ORDER — ALENDRONATE SODIUM 70 MG/1
TABLET ORAL
COMMUNITY
Start: 2022-07-01

## 2023-03-30 RX ORDER — ALENDRONATE SODIUM 70 MG/1
TABLET ORAL
COMMUNITY
Start: 2023-02-21

## 2023-03-30 NOTE — PROGRESS NOTES
"Patient: Reyna Riggs  YOB: 1945 77 y.o. female  Medical Record Number: [id number]    Chief Complaints:   Chief Complaint   Patient presents with   • Right Knee - Initial Evaluation, Pain       History of Present Illness:Reyna Riggs is a 77 y.o. female who presents with history of right lateral knee pain she had an episode unprovoked where she had sharp pain about the lateral joint she was almost unable to walk by the next day hip calm down to the point where is hardly noticeable.  She has been fairly asymptomatic since then.    Allergies: No Known Allergies    Medications:   Current Outpatient Medications   Medication Sig Dispense Refill   • alendronate (FOSAMAX) 70 MG tablet      • alendronate (FOSAMAX) 70 MG tablet      • ASPIRIN 81 PO 81 mg.     • Cholecalciferol (VITAMIN D) 1000 UNITS tablet Take 1 tablet by mouth Daily.     • Multiple Vitamin (MULTI-VITAMIN PO) Take  by mouth.     • rosuvastatin (CRESTOR) 5 MG tablet Take 1 tablet by mouth Daily.     • sertraline (ZOLOFT) 25 MG tablet 1 tablet.       No current facility-administered medications for this visit.         The following portions of the patient's history were reviewed and updated as appropriate: allergies, current medications, past family history, past medical history, past social history, past surgical history and problem list.    Review of Systems:   Pertinent positives/negatives listed in HPI above    Physical Exam:   Vitals:    03/30/23 1320   Temp: 97.8 °F (36.6 °C)   TempSrc: Temporal   Weight: 65 kg (143 lb 6.4 oz)   Height: 162.6 cm (64\")   PainSc:   4   PainLoc: Knee       General: A and O x 3, ASA, NAD      Knee Exam List: Knee:  right    ALIGNMENT:     Neutral  ,   Patella tracks   midline    GAIT:     Nonantalgic    SKIN:    No abnormality    RANGE OF MOTION:   0  -  135   DEG -  Mild lateral click/    STRENGTH:   5 / 5    LIGAMENTS:    No varus / valgus instability.   Negative  Lachman.    MENISCUS:     Negative   " Reggie       DISTAL PULSES:    Paplable    DISTAL SENSATION :   Intact    LYMPHATICS:     No   lymphadenopathy    OTHER:          - No  effusion      - No crepitance with ROM      - No Swelling /  tenderness to palpation pes anserine bursa        Radiology:  Xrays 3views left knee (ap,lateral, sunrise) were ordered and reviewed for evaluation of knee pain demonstrating moderate joint space narrowing - lateal joint and lateral chondrocalcinosis. There are no previous films for comparision.     Assessment/Plan: left knee oa /  Chondrocalcinosis -I have recommended quadricep strengthening exercises and avoidance of any squatting or pivoting rotational type maneuvers.  She has another episode she could try an anti-inflammatory Motrin or Aleve twice daily on a full stomach.  rto PRn, if she has another episode she will call back we could consider an injection.  There is potential this could progress to needing surgical intervention in the future but certainly not at this point.      Diagnoses and all orders for this visit:    1. Pain (Primary)  -     XR Knee 3 View Right         Angel Wu MD  3/30/2023

## 2023-10-25 ENCOUNTER — LAB REQUISITION (OUTPATIENT)
Dept: LAB | Facility: HOSPITAL | Age: 78
End: 2023-10-25
Payer: MEDICARE

## 2023-10-25 DIAGNOSIS — J32.0 CHRONIC MAXILLARY SINUSITIS: ICD-10-CM

## 2023-10-25 PROCEDURE — 87070 CULTURE OTHR SPECIMN AEROBIC: CPT | Performed by: OTOLARYNGOLOGY

## 2023-10-25 PROCEDURE — 87186 SC STD MICRODIL/AGAR DIL: CPT | Performed by: OTOLARYNGOLOGY

## 2023-10-25 PROCEDURE — 87205 SMEAR GRAM STAIN: CPT | Performed by: OTOLARYNGOLOGY

## 2023-10-25 PROCEDURE — 87147 CULTURE TYPE IMMUNOLOGIC: CPT | Performed by: OTOLARYNGOLOGY

## 2023-10-25 PROCEDURE — 87015 SPECIMEN INFECT AGNT CONCNTJ: CPT | Performed by: OTOLARYNGOLOGY

## 2023-10-25 PROCEDURE — 87075 CULTR BACTERIA EXCEPT BLOOD: CPT | Performed by: OTOLARYNGOLOGY

## 2023-10-28 LAB
BACTERIA SPEC AEROBE CULT: ABNORMAL
BACTERIA SPEC AEROBE CULT: ABNORMAL
BACTERIA SPEC ANAEROBE CULT: NORMAL
GRAM STN SPEC: ABNORMAL
GRAM STN SPEC: ABNORMAL

## 2023-11-01 LAB — BACTERIA SPEC ANAEROBE CULT: NORMAL

## 2023-12-28 ENCOUNTER — TRANSCRIBE ORDERS (OUTPATIENT)
Dept: ADMINISTRATIVE | Facility: HOSPITAL | Age: 78
End: 2023-12-28
Payer: MEDICARE

## 2023-12-28 DIAGNOSIS — Z12.31 SCREENING MAMMOGRAM, ENCOUNTER FOR: Primary | ICD-10-CM

## 2023-12-29 ENCOUNTER — TRANSCRIBE ORDERS (OUTPATIENT)
Dept: ADMINISTRATIVE | Facility: HOSPITAL | Age: 78
End: 2023-12-29
Payer: MEDICARE

## 2023-12-29 DIAGNOSIS — Z85.3 PERSONAL HISTORY OF MALIGNANT NEOPLASM OF BREAST: Primary | ICD-10-CM

## 2023-12-29 DIAGNOSIS — Z78.0 POSTMENOPAUSAL: ICD-10-CM

## 2024-01-22 ENCOUNTER — APPOINTMENT (OUTPATIENT)
Dept: WOMENS IMAGING | Facility: HOSPITAL | Age: 79
End: 2024-01-22
Payer: MEDICARE

## 2024-01-22 PROCEDURE — 77067 SCR MAMMO BI INCL CAD: CPT | Performed by: RADIOLOGY

## 2024-01-22 PROCEDURE — 77063 BREAST TOMOSYNTHESIS BI: CPT | Performed by: RADIOLOGY

## 2024-03-25 ENCOUNTER — PREP FOR SURGERY (OUTPATIENT)
Dept: OTHER | Facility: HOSPITAL | Age: 79
End: 2024-03-25
Payer: MEDICARE

## 2024-03-25 DIAGNOSIS — Z12.11 SCREEN FOR COLON CANCER: Primary | ICD-10-CM

## 2024-06-21 ENCOUNTER — HOSPITAL ENCOUNTER (OUTPATIENT)
Dept: MRI IMAGING | Facility: HOSPITAL | Age: 79
Discharge: HOME | End: 2024-06-21
Payer: MEDICARE

## 2024-06-21 DIAGNOSIS — Z85.3 PERSONAL HISTORY OF MALIGNANT NEOPLASM OF BREAST: ICD-10-CM

## 2024-06-21 PROCEDURE — 82565 ASSAY OF CREATININE: CPT

## 2024-06-21 PROCEDURE — C8937 CAD BREAST MRI: HCPCS

## 2024-06-21 PROCEDURE — A9577 INJ MULTIHANCE: HCPCS | Performed by: INTERNAL MEDICINE

## 2024-06-21 PROCEDURE — C8908 MRI W/O FOL W/CONT, BREAST,: HCPCS

## 2024-06-21 PROCEDURE — 0 GADOBENATE DIMEGLUMINE 529 MG/ML SOLUTION: Performed by: INTERNAL MEDICINE

## 2024-06-21 RX ADMIN — GADOBENATE DIMEGLUMINE 15 ML: 529 INJECTION, SOLUTION INTRAVENOUS at 16:14

## 2024-06-22 LAB — CREAT BLDA-MCNC: 0.9 MG/DL (ref 0.6–1.3)

## 2024-07-22 ENCOUNTER — HOSPITAL ENCOUNTER (OUTPATIENT)
Dept: BONE DENSITY | Facility: HOSPITAL | Age: 79
Discharge: HOME | End: 2024-07-22
Admitting: INTERNAL MEDICINE
Payer: MEDICARE

## 2024-07-22 DIAGNOSIS — Z78.0 POSTMENOPAUSAL: ICD-10-CM

## 2024-07-22 PROCEDURE — 77080 DXA BONE DENSITY AXIAL: CPT

## 2024-10-09 PROBLEM — Z12.11 SCREEN FOR COLON CANCER: Status: ACTIVE | Noted: 2024-03-25

## 2024-10-28 ENCOUNTER — ANESTHESIA EVENT (OUTPATIENT)
Dept: GASTROENTEROLOGY | Facility: HOSPITAL | Age: 79
End: 2024-10-28
Payer: MEDICARE

## 2024-10-28 ENCOUNTER — ANESTHESIA (OUTPATIENT)
Dept: GASTROENTEROLOGY | Facility: HOSPITAL | Age: 79
End: 2024-10-28
Payer: MEDICARE

## 2024-10-28 ENCOUNTER — HOSPITAL ENCOUNTER (OUTPATIENT)
Facility: HOSPITAL | Age: 79
Setting detail: HOSPITAL OUTPATIENT SURGERY
Discharge: HOME | End: 2024-10-28
Attending: SURGERY | Admitting: SURGERY
Payer: MEDICARE

## 2024-10-28 VITALS
BODY MASS INDEX: 23.85 KG/M2 | DIASTOLIC BLOOD PRESSURE: 67 MMHG | HEIGHT: 64 IN | SYSTOLIC BLOOD PRESSURE: 116 MMHG | HEART RATE: 68 BPM | RESPIRATION RATE: 16 BRPM | OXYGEN SATURATION: 98 % | WEIGHT: 139.7 LBS

## 2024-10-28 DIAGNOSIS — Z12.11 SCREEN FOR COLON CANCER: ICD-10-CM

## 2024-10-28 PROCEDURE — 45380 COLONOSCOPY AND BIOPSY: CPT | Performed by: SURGERY

## 2024-10-28 PROCEDURE — 88305 TISSUE EXAM BY PATHOLOGIST: CPT | Performed by: SURGERY

## 2024-10-28 PROCEDURE — 25010000002 GLUCAGON (RDNA) PER 1 MG: Performed by: SURGERY

## 2024-10-28 PROCEDURE — 25810000003 SODIUM CHLORIDE 0.9 % SOLUTION: Performed by: SURGERY

## 2024-10-28 PROCEDURE — 25010000002 LIDOCAINE 2% SOLUTION: Performed by: NURSE ANESTHETIST, CERTIFIED REGISTERED

## 2024-10-28 PROCEDURE — 25010000002 PROPOFOL 10 MG/ML EMULSION: Performed by: NURSE ANESTHETIST, CERTIFIED REGISTERED

## 2024-10-28 RX ORDER — SODIUM CHLORIDE 9 MG/ML
30 INJECTION, SOLUTION INTRAVENOUS CONTINUOUS PRN
Status: DISCONTINUED | OUTPATIENT
Start: 2024-10-28 | End: 2024-10-28 | Stop reason: HOSPADM

## 2024-10-28 RX ORDER — SODIUM CHLORIDE 0.9 % (FLUSH) 0.9 %
10 SYRINGE (ML) INJECTION AS NEEDED
Status: DISCONTINUED | OUTPATIENT
Start: 2024-10-28 | End: 2024-10-28 | Stop reason: HOSPADM

## 2024-10-28 RX ORDER — SODIUM CHLORIDE 0.9 % (FLUSH) 0.9 %
10 SYRINGE (ML) INJECTION EVERY 12 HOURS SCHEDULED
Status: DISCONTINUED | OUTPATIENT
Start: 2024-10-28 | End: 2024-10-28 | Stop reason: HOSPADM

## 2024-10-28 RX ORDER — LIDOCAINE HYDROCHLORIDE 20 MG/ML
INJECTION, SOLUTION INFILTRATION; PERINEURAL AS NEEDED
Status: DISCONTINUED | OUTPATIENT
Start: 2024-10-28 | End: 2024-10-28 | Stop reason: SURG

## 2024-10-28 RX ORDER — IBUPROFEN 600 MG/1
TABLET ORAL AS NEEDED
Status: DISCONTINUED | OUTPATIENT
Start: 2024-10-28 | End: 2024-10-28 | Stop reason: HOSPADM

## 2024-10-28 RX ORDER — SODIUM CHLORIDE 9 MG/ML
40 INJECTION, SOLUTION INTRAVENOUS AS NEEDED
Status: DISCONTINUED | OUTPATIENT
Start: 2024-10-28 | End: 2024-10-28 | Stop reason: HOSPADM

## 2024-10-28 RX ORDER — PROPOFOL 10 MG/ML
VIAL (ML) INTRAVENOUS AS NEEDED
Status: DISCONTINUED | OUTPATIENT
Start: 2024-10-28 | End: 2024-10-28 | Stop reason: SURG

## 2024-10-28 RX ADMIN — PROPOFOL 160 MCG/KG/MIN: 10 INJECTION, EMULSION INTRAVENOUS at 10:27

## 2024-10-28 RX ADMIN — PROPOFOL 70 MG: 10 INJECTION, EMULSION INTRAVENOUS at 10:26

## 2024-10-28 RX ADMIN — SODIUM CHLORIDE 30 ML/HR: 9 INJECTION, SOLUTION INTRAVENOUS at 09:52

## 2024-10-28 RX ADMIN — LIDOCAINE HYDROCHLORIDE 80 MG: 20 INJECTION, SOLUTION INFILTRATION; PERINEURAL at 10:26

## 2024-10-28 NOTE — OP NOTE
Colonoscopy Procedure Note  Reyna Riggs  1945  Date of Procedure: 10/28/24    Pre-operative Diagnosis:    Screening, average risk    Post-operative Diagnosis:  Descending colon polyp, 8 mm.  Removed via cold biopsy forceps    Procedure: Colonoscopy with polypectomy        Recommendations:   Colonoscopy in 5 years if adenomatous.  The office will call within the next  3-10 days with a final recommendation.  Keep a copy of the photographs of the procedure given to you today for possible need for reference in the future.      Surgeon: Dixie    Anesthetic: MAC per Henok Domingo MD    Scope Withdrawal Time:  6 minutes  15 seconds    Procedure Details     MAC anesthesia was induced.  The 180 Colonoscopy was inserted blindly into the rectum and advanced to the cecum, with relative ease,  without need for pressure, lift, or turning.    Cecum was identified by the appendiceal orifice and the ileocecal valve and photographed for documentation.      Prep quality was excellent.  A careful inspection was made as the scope was withdrawn, including a retroflexed view of the rectum; there was no suggestion of presence of angiodysplasias, or colitis, but there was the polyp without diverticula, with noted interventions.     Retroflexion in the rectum revealed no abnormalities.      Elena Colin MD  10/28/24

## 2024-10-28 NOTE — H&P
Cc: Endoscopy Visit    HPI: 79 y.o. female here for screening with no prior polyps and no family history.  She would want to repeat this in 10 years if in excellent health.     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       Medication List        ASK your doctor about these medications      * alendronate 70 MG tablet  Commonly known as: FOSAMAX     * alendronate 70 MG tablet  Commonly known as: FOSAMAX     ASPIRIN 81 PO     cholecalciferol 25 MCG (1000 UT) tablet  Commonly known as: VITAMIN D3     multivitamin tablet tablet  Commonly known as: THERAGRAN     rosuvastatin 5 MG tablet  Commonly known as: CRESTOR     sertraline 25 MG tablet  Commonly known as: ZOLOFT           * This list has 2 medication(s) that are the same as other medications prescribed for you. Read the directions carefully, and ask your doctor or other care provider to review them with you.                  Family History   Problem Relation Age of Onset    Esophageal cancer Father 74    Cancer Father     Kidney cancer Brother 53    Cancer Brother     Diabetes Other     Diabetes Mother     Diabetes Sister        Social History     Socioeconomic History    Marital status:      Spouse name: Juan David    Number of children: 2    Years of education: College   Tobacco Use    Smoking  status: Former     Current packs/day: 0.00     Average packs/day: 1 pack/day for 10.0 years (10.0 ttl pk-yrs)     Types: Cigarettes     Start date: 10/19/1965     Quit date: 1975     Years since quittin.3    Smokeless tobacco: Never   Vaping Use    Vaping status: Never Used   Substance and Sexual Activity    Alcohol use: Yes     Alcohol/week: 7.0 standard drinks of alcohol     Types: 7 Glasses of wine per week     Comment: 1 GLASS OF ALCOHOL DAILY    Drug use: Never    Sexual activity: Not Currently     Partners: Male       Vitals:    10/28/24 0939   BP: 122/99   Pulse: 64   Resp: 14   SpO2: 97%       Body mass index is 23.98 kg/m².      Physical Exam    General: No acute distress  Lungs: No labored breathing, Pulse oximetry on room air is 97%.  Heart/EKG: RRR  Abdomen: no complaints of pain  Mental:  Awake, alert, and oriented    Imp:     Screening average risk     Plan:  C alejandro Colin MD  10:23 EDT

## 2024-10-28 NOTE — ANESTHESIA PREPROCEDURE EVALUATION
Anesthesia Evaluation     Patient summary reviewed and Nursing notes reviewed   NPO Solid Status: > 8 hours  NPO Liquid Status: > 2 hours           Airway   Mallampati: II  TM distance: >3 FB  Neck ROM: full  No difficulty expected  Dental - normal exam     Pulmonary - normal exam    breath sounds clear to auscultation  (+) a smoker Former,  Cardiovascular - normal exam    Rhythm: regular  Rate: normal    (+) hyperlipidemia      Neuro/Psych  (+) psychiatric history Depression and Anxiety  GI/Hepatic/Renal/Endo    (+) thyroid problem hyperthyroidism    Musculoskeletal         ROS comment: Lumbosacral DDD  Abdominal  - normal exam   Substance History      OB/GYN          Other   arthritis,   history of cancer      Other Comment: Hx breast  CA                Anesthesia Plan    ASA 2     MAC     intravenous induction     Anesthetic plan, risks, benefits, and alternatives have been provided, discussed and informed consent has been obtained with: patient.      CODE STATUS:

## 2024-10-28 NOTE — ANESTHESIA POSTPROCEDURE EVALUATION
Patient: eRyna Riggs    Procedure Summary       Date: 10/28/24 Room / Location: [facility] ENDOSCOPY 1 / [facility] ENDOSCOPY    Anesthesia Start: 1022 Anesthesia Stop: 1042    Procedure: COLONOSCOPY TO CECUM WITH COLD BIOPSY POLYPECTOMY Diagnosis:       Screen for colon cancer      (Screen for colon cancer [Z12.11])    Surgeons: Elena Colin MD Provider: Henok Domingo MD    Anesthesia Type: MAC ASA Status: 2            Anesthesia Type: MAC    Vitals  Vitals Value Taken Time   /67 10/28/24 1101   Temp     Pulse 68 10/28/24 1101   Resp 16 10/28/24 1101   SpO2 98 % 10/28/24 1101           Post Anesthesia Care and Evaluation    Patient location during evaluation: PHASE II  Patient participation: complete - patient participated  Level of consciousness: awake and alert  Pain management: adequate    Airway patency: patent  Anesthetic complications: No anesthetic complications  PONV Status: none  Cardiovascular status: acceptable and hemodynamically stable  Respiratory status: acceptable, nonlabored ventilation and spontaneous ventilation  Hydration status: acceptable

## 2024-10-29 LAB
CYTO UR: NORMAL
LAB AP CASE REPORT: NORMAL
PATH REPORT.FINAL DX SPEC: NORMAL
PATH REPORT.GROSS SPEC: NORMAL

## 2024-10-30 NOTE — PROGRESS NOTES
Paulette (endoscopy liaison),    Please call patient tto inform them of these findings and recommendations and ensure that any pamphlets that were to be given to the patient at the hospital were received.  Ensure that a letter is sent to the patient, recall method entered into the computer and the HM (Health Maintenance) section updated as to recommended endoscopy follow up.    Thanks  Dr Colin    Colonoscopy Procedure Note  Reyna OSCAR Riggs  1945  Date of Procedure: 10/28/24    Pre-operative Diagnosis:    · Screening, average risk    Post-operative Diagnosis:  · Descending colon polyp, 8 mm.  Removed via cold biopsy forceps;  ADENOMATOUS    Procedure: Colonoscopy with polypectomy     Recommendations:   1. Colonoscopy in 5 years if adenomatous.  The office will call within the next  3-10 days with a final recommendation.;  5 YEARS  2. Keep a copy of the photographs of the procedure given to you today for possible need for reference in the future.

## 2024-10-31 ENCOUNTER — TELEPHONE (OUTPATIENT)
Dept: SURGERY | Facility: CLINIC | Age: 79
End: 2024-10-31
Payer: MEDICARE

## 2024-10-31 NOTE — TELEPHONE ENCOUNTER
----- Message from Elena Colin sent at 10/30/2024  8:10 AM EDT -----  Paulette (endoscopy liaison),    Please call patient tto inform them of these findings and recommendations and ensure that any pamphlets that were to be given to the patient at the hospital were received.  Ensure that a letter is sent to the patient, recall method entered into the computer and the HM (Health Maintenance) section updated as to recommended endoscopy follow up.    Thanks  Dr Colin    Colonoscopy Procedure Note  Reyna Riggs  1945  Date of Procedure: 10/28/24     Pre-operative Diagnosis:    · Screening, average risk     Post-operative Diagnosis:  · Descending colon polyp, 8 mm.  Removed via cold biopsy forceps;  ADENOMATOUS     Procedure: Colonoscopy with polypectomy                                         Recommendations:   1. Colonoscopy in 5 years if adenomatous.  The office will call within the next  3-10 days with a final recommendation.;  5 YEARS  2. Keep a copy of the photographs of the procedure given to you today for possible need for reference in the future.

## 2025-01-16 ENCOUNTER — TRANSCRIBE ORDERS (OUTPATIENT)
Dept: ADMINISTRATIVE | Facility: HOSPITAL | Age: 80
End: 2025-01-16
Payer: MEDICARE

## 2025-01-16 DIAGNOSIS — Z12.31 VISIT FOR SCREENING MAMMOGRAM: Primary | ICD-10-CM

## 2025-01-16 DIAGNOSIS — Z85.3 HISTORY OF BREAST CANCER: ICD-10-CM

## 2025-03-07 ENCOUNTER — HOSPITAL ENCOUNTER (OUTPATIENT)
Dept: MAMMOGRAPHY | Facility: HOSPITAL | Age: 80
Discharge: HOME | End: 2025-03-07
Admitting: INTERNAL MEDICINE
Payer: MEDICARE

## 2025-03-07 DIAGNOSIS — Z12.31 VISIT FOR SCREENING MAMMOGRAM: ICD-10-CM

## 2025-03-07 PROCEDURE — 77063 BREAST TOMOSYNTHESIS BI: CPT

## 2025-03-07 PROCEDURE — 77067 SCR MAMMO BI INCL CAD: CPT

## 2025-06-02 ENCOUNTER — HOSPITAL ENCOUNTER (OUTPATIENT)
Dept: MRI IMAGING | Facility: HOSPITAL | Age: 80
Discharge: HOME | End: 2025-06-02
Admitting: INTERNAL MEDICINE
Payer: MEDICARE

## 2025-06-02 DIAGNOSIS — Z12.31 VISIT FOR SCREENING MAMMOGRAM: ICD-10-CM

## 2025-06-02 DIAGNOSIS — Z85.3 HISTORY OF BREAST CANCER: ICD-10-CM

## 2025-06-02 PROCEDURE — C8908 MRI W/O FOL W/CONT, BREAST,: HCPCS

## 2025-06-02 PROCEDURE — A9577 INJ MULTIHANCE: HCPCS | Performed by: INTERNAL MEDICINE

## 2025-06-02 PROCEDURE — C8937 CAD BREAST MRI: HCPCS

## 2025-06-02 PROCEDURE — 25510000002 GADOBENATE DIMEGLUMINE 529 MG/ML SOLUTION: Performed by: INTERNAL MEDICINE

## 2025-06-02 RX ADMIN — GADOBENATE DIMEGLUMINE 12 ML: 529 INJECTION, SOLUTION INTRAVENOUS at 16:08

## 2025-08-25 ENCOUNTER — OFFICE VISIT (OUTPATIENT)
Dept: ORTHOPEDIC SURGERY | Facility: CLINIC | Age: 80
End: 2025-08-25
Payer: MEDICARE

## 2025-08-25 VITALS — TEMPERATURE: 97.5 F | WEIGHT: 140.3 LBS | BODY MASS INDEX: 23.95 KG/M2 | HEIGHT: 64 IN

## 2025-08-25 DIAGNOSIS — G89.29 CHRONIC RIGHT SHOULDER PAIN: ICD-10-CM

## 2025-08-25 DIAGNOSIS — M75.41 ROTATOR CUFF IMPINGEMENT SYNDROME OF RIGHT SHOULDER: Primary | ICD-10-CM

## 2025-08-25 DIAGNOSIS — M19.011 ARTHRITIS OF RIGHT ACROMIOCLAVICULAR JOINT: ICD-10-CM

## 2025-08-25 DIAGNOSIS — M25.511 CHRONIC RIGHT SHOULDER PAIN: ICD-10-CM

## 2025-08-25 PROCEDURE — 20610 DRAIN/INJ JOINT/BURSA W/O US: CPT | Performed by: NURSE PRACTITIONER

## 2025-08-25 PROCEDURE — 73030 X-RAY EXAM OF SHOULDER: CPT | Performed by: NURSE PRACTITIONER

## 2025-08-25 PROCEDURE — 99213 OFFICE O/P EST LOW 20 MIN: CPT | Performed by: NURSE PRACTITIONER

## 2025-08-25 PROCEDURE — 1160F RVW MEDS BY RX/DR IN RCRD: CPT | Performed by: NURSE PRACTITIONER

## 2025-08-25 PROCEDURE — 1159F MED LIST DOCD IN RCRD: CPT | Performed by: NURSE PRACTITIONER

## 2025-08-25 RX ORDER — LIDOCAINE HYDROCHLORIDE 10 MG/ML
2 INJECTION, SOLUTION EPIDURAL; INFILTRATION; INTRACAUDAL; PERINEURAL
Status: COMPLETED | OUTPATIENT
Start: 2025-08-25 | End: 2025-08-25

## 2025-08-25 RX ORDER — METHYLPREDNISOLONE ACETATE 80 MG/ML
80 INJECTION, SUSPENSION INTRA-ARTICULAR; INTRALESIONAL; INTRAMUSCULAR; SOFT TISSUE
Status: COMPLETED | OUTPATIENT
Start: 2025-08-25 | End: 2025-08-25

## 2025-08-25 RX ADMIN — METHYLPREDNISOLONE ACETATE 80 MG: 80 INJECTION, SUSPENSION INTRA-ARTICULAR; INTRALESIONAL; INTRAMUSCULAR; SOFT TISSUE at 11:26

## 2025-08-25 RX ADMIN — LIDOCAINE HYDROCHLORIDE 2 ML: 10 INJECTION, SOLUTION EPIDURAL; INFILTRATION; INTRACAUDAL; PERINEURAL at 11:26

## (undated) DEVICE — ADAPT CLN BIOGUARD AIR/H2O DISP

## (undated) DEVICE — KT ORCA ORCAPOD DISP STRL

## (undated) DEVICE — MSK PROC CURAPLEX O2 2/ADAPT 7FT

## (undated) DEVICE — TUBING, SUCTION, 1/4" X 10', STRAIGHT: Brand: MEDLINE

## (undated) DEVICE — SENSR O2 OXIMAX FNGR A/ 18IN NONSTR

## (undated) DEVICE — SINGLE-USE BIOPSY FORCEPS: Brand: RADIAL JAW 4

## (undated) DEVICE — LN SMPL CO2 SHTRM SD STREAM W/M LUER